# Patient Record
Sex: FEMALE | Race: WHITE | NOT HISPANIC OR LATINO | URBAN - METROPOLITAN AREA
[De-identification: names, ages, dates, MRNs, and addresses within clinical notes are randomized per-mention and may not be internally consistent; named-entity substitution may affect disease eponyms.]

---

## 2019-12-06 ENCOUNTER — OUTPATIENT (OUTPATIENT)
Dept: OUTPATIENT SERVICES | Facility: HOSPITAL | Age: 64
LOS: 1 days | Discharge: HOME | End: 2019-12-06

## 2019-12-06 DIAGNOSIS — I10 ESSENTIAL (PRIMARY) HYPERTENSION: ICD-10-CM

## 2020-04-13 ENCOUNTER — OUTPATIENT (OUTPATIENT)
Dept: OUTPATIENT SERVICES | Facility: HOSPITAL | Age: 65
LOS: 1 days | Discharge: HOME | End: 2020-04-13
Payer: COMMERCIAL

## 2020-04-13 DIAGNOSIS — U07.1 COVID-19: ICD-10-CM

## 2020-04-13 PROCEDURE — 71045 X-RAY EXAM CHEST 1 VIEW: CPT | Mod: 26

## 2020-04-13 PROCEDURE — 71046 X-RAY EXAM CHEST 2 VIEWS: CPT | Mod: 26

## 2022-10-23 ENCOUNTER — INPATIENT (INPATIENT)
Facility: HOSPITAL | Age: 67
LOS: 0 days | Discharge: HOME | End: 2022-10-24
Attending: SURGERY | Admitting: SURGERY

## 2022-10-23 VITALS
HEART RATE: 97 BPM | TEMPERATURE: 98 F | RESPIRATION RATE: 17 BRPM | SYSTOLIC BLOOD PRESSURE: 138 MMHG | OXYGEN SATURATION: 99 % | WEIGHT: 149.91 LBS | DIASTOLIC BLOOD PRESSURE: 86 MMHG

## 2022-10-23 LAB
ALBUMIN SERPL ELPH-MCNC: 4.5 G/DL — SIGNIFICANT CHANGE UP (ref 3.5–5.2)
ALP SERPL-CCNC: 73 U/L — SIGNIFICANT CHANGE UP (ref 30–115)
ALT FLD-CCNC: 37 U/L — SIGNIFICANT CHANGE UP (ref 0–41)
ANION GAP SERPL CALC-SCNC: 13 MMOL/L — SIGNIFICANT CHANGE UP (ref 7–14)
ANION GAP SERPL CALC-SCNC: 16 MMOL/L — HIGH (ref 7–14)
APTT BLD: 26.8 SEC — LOW (ref 27–39.2)
AST SERPL-CCNC: 47 U/L — HIGH (ref 0–41)
BASOPHILS # BLD AUTO: 0 K/UL — SIGNIFICANT CHANGE UP (ref 0–0.2)
BASOPHILS # BLD AUTO: 0.02 K/UL — SIGNIFICANT CHANGE UP (ref 0–0.2)
BASOPHILS NFR BLD AUTO: 0 % — SIGNIFICANT CHANGE UP (ref 0–1)
BASOPHILS NFR BLD AUTO: 0.4 % — SIGNIFICANT CHANGE UP (ref 0–1)
BILIRUB SERPL-MCNC: 0.4 MG/DL — SIGNIFICANT CHANGE UP (ref 0.2–1.2)
BLD GP AB SCN SERPL QL: SIGNIFICANT CHANGE UP
BUN SERPL-MCNC: 14 MG/DL — SIGNIFICANT CHANGE UP (ref 10–20)
BUN SERPL-MCNC: 16 MG/DL — SIGNIFICANT CHANGE UP (ref 10–20)
CALCIUM SERPL-MCNC: 8.9 MG/DL — SIGNIFICANT CHANGE UP (ref 8.4–10.5)
CALCIUM SERPL-MCNC: 9.4 MG/DL — SIGNIFICANT CHANGE UP (ref 8.4–10.5)
CHLORIDE SERPL-SCNC: 95 MMOL/L — LOW (ref 98–110)
CHLORIDE SERPL-SCNC: 96 MMOL/L — LOW (ref 98–110)
CO2 SERPL-SCNC: 24 MMOL/L — SIGNIFICANT CHANGE UP (ref 17–32)
CO2 SERPL-SCNC: 26 MMOL/L — SIGNIFICANT CHANGE UP (ref 17–32)
CREAT SERPL-MCNC: 0.7 MG/DL — SIGNIFICANT CHANGE UP (ref 0.7–1.5)
CREAT SERPL-MCNC: 0.8 MG/DL — SIGNIFICANT CHANGE UP (ref 0.7–1.5)
EGFR: 81 ML/MIN/1.73M2 — SIGNIFICANT CHANGE UP
EGFR: 95 ML/MIN/1.73M2 — SIGNIFICANT CHANGE UP
EOSINOPHIL # BLD AUTO: 0 K/UL — SIGNIFICANT CHANGE UP (ref 0–0.7)
EOSINOPHIL # BLD AUTO: 0.02 K/UL — SIGNIFICANT CHANGE UP (ref 0–0.7)
EOSINOPHIL NFR BLD AUTO: 0 % — SIGNIFICANT CHANGE UP (ref 0–8)
EOSINOPHIL NFR BLD AUTO: 0.4 % — SIGNIFICANT CHANGE UP (ref 0–8)
ETHANOL SERPL-MCNC: 271 MG/DL — HIGH
GIANT PLATELETS BLD QL SMEAR: PRESENT — SIGNIFICANT CHANGE UP
GLUCOSE SERPL-MCNC: 105 MG/DL — HIGH (ref 70–99)
GLUCOSE SERPL-MCNC: 115 MG/DL — HIGH (ref 70–99)
HCT VFR BLD CALC: 31 % — LOW (ref 37–47)
HCT VFR BLD CALC: 37.8 % — SIGNIFICANT CHANGE UP (ref 37–47)
HGB BLD-MCNC: 10.9 G/DL — LOW (ref 12–16)
HGB BLD-MCNC: 13.1 G/DL — SIGNIFICANT CHANGE UP (ref 12–16)
IMM GRANULOCYTES NFR BLD AUTO: 0.5 % — HIGH (ref 0.1–0.3)
INR BLD: 0.87 RATIO — SIGNIFICANT CHANGE UP (ref 0.65–1.3)
LACTATE SERPL-SCNC: 1.9 MMOL/L — SIGNIFICANT CHANGE UP (ref 0.7–2)
LIDOCAIN IGE QN: 35 U/L — SIGNIFICANT CHANGE UP (ref 7–60)
LYMPHOCYTES # BLD AUTO: 1.35 K/UL — SIGNIFICANT CHANGE UP (ref 1.2–3.4)
LYMPHOCYTES # BLD AUTO: 2.17 K/UL — SIGNIFICANT CHANGE UP (ref 1.2–3.4)
LYMPHOCYTES # BLD AUTO: 24.2 % — SIGNIFICANT CHANGE UP (ref 20.5–51.1)
LYMPHOCYTES # BLD AUTO: 28.7 % — SIGNIFICANT CHANGE UP (ref 20.5–51.1)
MAGNESIUM SERPL-MCNC: 1.9 MG/DL — SIGNIFICANT CHANGE UP (ref 1.8–2.4)
MANUAL SMEAR VERIFICATION: SIGNIFICANT CHANGE UP
MCHC RBC-ENTMCNC: 31.1 PG — HIGH (ref 27–31)
MCHC RBC-ENTMCNC: 31.5 PG — HIGH (ref 27–31)
MCHC RBC-ENTMCNC: 34.7 G/DL — SIGNIFICANT CHANGE UP (ref 32–37)
MCHC RBC-ENTMCNC: 35.2 G/DL — SIGNIFICANT CHANGE UP (ref 32–37)
MCV RBC AUTO: 88.6 FL — SIGNIFICANT CHANGE UP (ref 81–99)
MCV RBC AUTO: 90.9 FL — SIGNIFICANT CHANGE UP (ref 81–99)
MONOCYTES # BLD AUTO: 0.26 K/UL — SIGNIFICANT CHANGE UP (ref 0.1–0.6)
MONOCYTES # BLD AUTO: 0.47 K/UL — SIGNIFICANT CHANGE UP (ref 0.1–0.6)
MONOCYTES NFR BLD AUTO: 3.5 % — SIGNIFICANT CHANGE UP (ref 1.7–9.3)
MONOCYTES NFR BLD AUTO: 8.4 % — SIGNIFICANT CHANGE UP (ref 1.7–9.3)
NEUTROPHILS # BLD AUTO: 3.69 K/UL — SIGNIFICANT CHANGE UP (ref 1.4–6.5)
NEUTROPHILS # BLD AUTO: 5.12 K/UL — SIGNIFICANT CHANGE UP (ref 1.4–6.5)
NEUTROPHILS NFR BLD AUTO: 66.1 % — SIGNIFICANT CHANGE UP (ref 42.2–75.2)
NEUTROPHILS NFR BLD AUTO: 67.8 % — SIGNIFICANT CHANGE UP (ref 42.2–75.2)
NRBC # BLD: 0 /100 WBCS — SIGNIFICANT CHANGE UP (ref 0–0)
NRBC # BLD: 1 /100 — HIGH (ref 0–0)
NRBC # BLD: SIGNIFICANT CHANGE UP /100 WBCS (ref 0–0)
PHOSPHATE SERPL-MCNC: 4.3 MG/DL — SIGNIFICANT CHANGE UP (ref 2.1–4.9)
PLAT MORPH BLD: NORMAL — SIGNIFICANT CHANGE UP
PLATELET # BLD AUTO: 223 K/UL — SIGNIFICANT CHANGE UP (ref 130–400)
PLATELET # BLD AUTO: 276 K/UL — SIGNIFICANT CHANGE UP (ref 130–400)
POTASSIUM SERPL-MCNC: 4 MMOL/L — SIGNIFICANT CHANGE UP (ref 3.5–5)
POTASSIUM SERPL-MCNC: 4.4 MMOL/L — SIGNIFICANT CHANGE UP (ref 3.5–5)
POTASSIUM SERPL-SCNC: 4 MMOL/L — SIGNIFICANT CHANGE UP (ref 3.5–5)
POTASSIUM SERPL-SCNC: 4.4 MMOL/L — SIGNIFICANT CHANGE UP (ref 3.5–5)
PROT SERPL-MCNC: 7 G/DL — SIGNIFICANT CHANGE UP (ref 6–8)
PROTHROM AB SERPL-ACNC: 9.9 SEC — LOW (ref 9.95–12.87)
RBC # BLD: 3.5 M/UL — LOW (ref 4.2–5.4)
RBC # BLD: 4.16 M/UL — LOW (ref 4.2–5.4)
RBC # FLD: 13.3 % — SIGNIFICANT CHANGE UP (ref 11.5–14.5)
RBC # FLD: 13.6 % — SIGNIFICANT CHANGE UP (ref 11.5–14.5)
RBC BLD AUTO: NORMAL — SIGNIFICANT CHANGE UP
SARS-COV-2 RNA SPEC QL NAA+PROBE: SIGNIFICANT CHANGE UP
SODIUM SERPL-SCNC: 134 MMOL/L — LOW (ref 135–146)
SODIUM SERPL-SCNC: 136 MMOL/L — SIGNIFICANT CHANGE UP (ref 135–146)
WBC # BLD: 5.58 K/UL — SIGNIFICANT CHANGE UP (ref 4.8–10.8)
WBC # BLD: 7.55 K/UL — SIGNIFICANT CHANGE UP (ref 4.8–10.8)
WBC # FLD AUTO: 5.58 K/UL — SIGNIFICANT CHANGE UP (ref 4.8–10.8)
WBC # FLD AUTO: 7.55 K/UL — SIGNIFICANT CHANGE UP (ref 4.8–10.8)

## 2022-10-23 PROCEDURE — 70486 CT MAXILLOFACIAL W/O DYE: CPT | Mod: 26,MA

## 2022-10-23 PROCEDURE — 72170 X-RAY EXAM OF PELVIS: CPT | Mod: 26

## 2022-10-23 PROCEDURE — 93010 ELECTROCARDIOGRAM REPORT: CPT

## 2022-10-23 PROCEDURE — 71260 CT THORAX DX C+: CPT | Mod: 26,MA

## 2022-10-23 PROCEDURE — 71045 X-RAY EXAM CHEST 1 VIEW: CPT | Mod: 26

## 2022-10-23 PROCEDURE — 72125 CT NECK SPINE W/O DYE: CPT | Mod: 26,MA

## 2022-10-23 PROCEDURE — 70450 CT HEAD/BRAIN W/O DYE: CPT | Mod: 26,MA

## 2022-10-23 PROCEDURE — 99281 EMR DPT VST MAYX REQ PHY/QHP: CPT

## 2022-10-23 PROCEDURE — 74177 CT ABD & PELVIS W/CONTRAST: CPT | Mod: 26,MA

## 2022-10-23 PROCEDURE — 99283 EMERGENCY DEPT VISIT LOW MDM: CPT

## 2022-10-23 PROCEDURE — 99222 1ST HOSP IP/OBS MODERATE 55: CPT

## 2022-10-23 PROCEDURE — 99285 EMERGENCY DEPT VISIT HI MDM: CPT

## 2022-10-23 RX ORDER — BENAZEPRIL HYDROCHLORIDE AND HYDROCHLOROTHIAZIDE 10; 12.5 MG/1; MG/1
1 TABLET, FILM COATED ORAL
Qty: 0 | Refills: 0 | DISCHARGE

## 2022-10-23 RX ORDER — HYDROCHLOROTHIAZIDE 25 MG
25 TABLET ORAL DAILY
Refills: 0 | Status: DISCONTINUED | OUTPATIENT
Start: 2022-10-23 | End: 2022-10-24

## 2022-10-23 RX ORDER — SODIUM CHLORIDE 9 MG/ML
250 INJECTION INTRAMUSCULAR; INTRAVENOUS; SUBCUTANEOUS ONCE
Refills: 0 | Status: COMPLETED | OUTPATIENT
Start: 2022-10-23 | End: 2022-10-23

## 2022-10-23 RX ORDER — SODIUM CHLORIDE 9 MG/ML
1000 INJECTION, SOLUTION INTRAVENOUS
Refills: 0 | Status: DISCONTINUED | OUTPATIENT
Start: 2022-10-23 | End: 2022-10-23

## 2022-10-23 RX ORDER — IBUPROFEN 200 MG
600 TABLET ORAL EVERY 8 HOURS
Refills: 0 | Status: DISCONTINUED | OUTPATIENT
Start: 2022-10-23 | End: 2022-10-23

## 2022-10-23 RX ORDER — LISINOPRIL 2.5 MG/1
20 TABLET ORAL DAILY
Refills: 0 | Status: DISCONTINUED | OUTPATIENT
Start: 2022-10-23 | End: 2022-10-24

## 2022-10-23 RX ORDER — FOLIC ACID 0.8 MG
1 TABLET ORAL DAILY
Refills: 0 | Status: DISCONTINUED | OUTPATIENT
Start: 2022-10-23 | End: 2022-10-24

## 2022-10-23 RX ORDER — TETANUS TOXOID, REDUCED DIPHTHERIA TOXOID AND ACELLULAR PERTUSSIS VACCINE, ADSORBED 5; 2.5; 8; 8; 2.5 [IU]/.5ML; [IU]/.5ML; UG/.5ML; UG/.5ML; UG/.5ML
0.5 SUSPENSION INTRAMUSCULAR ONCE
Refills: 0 | Status: COMPLETED | OUTPATIENT
Start: 2022-10-23 | End: 2022-10-23

## 2022-10-23 RX ORDER — GABAPENTIN 400 MG/1
300 CAPSULE ORAL THREE TIMES A DAY
Refills: 0 | Status: DISCONTINUED | OUTPATIENT
Start: 2022-10-23 | End: 2022-10-24

## 2022-10-23 RX ORDER — ACETAMINOPHEN 500 MG
650 TABLET ORAL EVERY 6 HOURS
Refills: 0 | Status: DISCONTINUED | OUTPATIENT
Start: 2022-10-23 | End: 2022-10-24

## 2022-10-23 RX ORDER — THIAMINE MONONITRATE (VIT B1) 100 MG
100 TABLET ORAL DAILY
Refills: 0 | Status: DISCONTINUED | OUTPATIENT
Start: 2022-10-23 | End: 2022-10-24

## 2022-10-23 RX ADMIN — GABAPENTIN 300 MILLIGRAM(S): 400 CAPSULE ORAL at 21:38

## 2022-10-23 RX ADMIN — LISINOPRIL 20 MILLIGRAM(S): 2.5 TABLET ORAL at 06:57

## 2022-10-23 RX ADMIN — Medication 650 MILLIGRAM(S): at 19:40

## 2022-10-23 RX ADMIN — Medication 25 MILLIGRAM(S): at 06:58

## 2022-10-23 RX ADMIN — Medication 650 MILLIGRAM(S): at 06:58

## 2022-10-23 RX ADMIN — TETANUS TOXOID, REDUCED DIPHTHERIA TOXOID AND ACELLULAR PERTUSSIS VACCINE, ADSORBED 0.5 MILLILITER(S): 5; 2.5; 8; 8; 2.5 SUSPENSION INTRAMUSCULAR at 02:53

## 2022-10-23 RX ADMIN — Medication 600 MILLIGRAM(S): at 06:57

## 2022-10-23 RX ADMIN — SODIUM CHLORIDE 125 MILLILITER(S): 9 INJECTION, SOLUTION INTRAVENOUS at 06:34

## 2022-10-23 RX ADMIN — SODIUM CHLORIDE 250 MILLILITER(S): 9 INJECTION INTRAMUSCULAR; INTRAVENOUS; SUBCUTANEOUS at 02:55

## 2022-10-23 RX ADMIN — GABAPENTIN 300 MILLIGRAM(S): 400 CAPSULE ORAL at 06:57

## 2022-10-23 NOTE — H&P ADULT - HISTORY OF PRESENT ILLNESS
TRAUMA ACTIVATION LEVEL:  ALERT   ACTIVATED BY:  ED**  INTUBATED: NO**      MECHANISM OF INJURY:   [x] Fall	      GCS: 15 	E: 4	V: 5	M: 6    HPI:    66yF w/ PMHx of HTN seen as Trauma Alert  s/p unwitnessed fall, +ht, ?loc, -ac, +etoh.  The patient was BIBA after her tenant found her on the floor of her living room at the base of the stairs. The patient is accompanied by two people who stated they were at a dinner party with her, saw her about 30 minutes before the fall. The patient admits to drinking some wine, does not remember the fall, denies any pain. The patient has a laceration to the back of her head, not actively bleeding. During examination of her head and neck, several pieces of glass were pulled from her matted hair.

## 2022-10-23 NOTE — CONSULT NOTE ADULT - ASSESSMENT
66yF w/ PMHx of HTN seen as Trauma Alert  s/p unwitnessed fall, +ht, ?loc, -ac, +etoh.  The patient was BIBA after her tenant found her on the floor of her living room at the base of the stairs    # Fall associated with trauma  # Left posterior scalp laceration  # Left rib fractures non-displaced 3-8th, 7th displaced  # Left gluteal hematoma  - pain control  - trauma team management    # ETOH intoxication  -     # HTN    # DVT prophylaxis - on lovenox subcut  # Code status - Full Code 66yF w/ PMHx of HTN seen as Trauma Alert  s/p unwitnessed fall, +ht, ?loc, -ac, +etoh.  The patient was BIBA after her tenant found her on the floor of her living room at the base of the stairs    # Syncope 2/2 ETOH intoxication vs. arrhythmogenic/seziure etiology  # Fall associated with trauma  # Left posterior scalp laceration  # Left rib fractures non-displaced 3-8th, 7th displaced  # Left gluteal hematoma  - pain control  - trauma team management  - no prior cardiac history or seizure reported, EKG reviewed no ischemic changes  - given significant murmur on exam and left carotid bruit, may obtain vascular duplex of bilateral carotid and 2D echo to evaluate for carotid stenosis or significant valvular disease   - obtain TSH and vitamin B12 level  - may consider routine EEG if there is further consider for seizure etiology    # ETOH intoxication  - consider CIWA protocol  - consider adding thiamine and folate given ETOH use    # HTN, stable  - c/w home BP meds    # DVT prophylaxis - on lovenox subcut  # Code status - Full code 66yF w/ PMHx of HTN seen as Trauma Alert  s/p unwitnessed fall, +ht, ?loc, -ac, +etoh.  The patient was BIBA after her tenant found her on the floor of her living room at the base of the stairs    # Syncope 2/2 ETOH intoxication rule out arrhythmogenic vs. seizure etiology  # Fall associated with trauma  # Left posterior scalp laceration  # Left rib fractures non-displaced 3-8th, 7th displaced  # Compression fracture and transverse process fracture of lumbar vertebrae  # Left gluteal hematoma  - pain control  - trauma team management  - no prior cardiac history or seizure reported, EKG reviewed no ischemic changes  - given significant murmur on exam and left carotid bruit, may obtain vascular duplex of bilateral carotid and 2D echo to evaluate for carotid stenosis or significant valvular disease   - obtain TSH and vitamin B12 level  - may consider routine EEG if there is further consider for seizure etiology    # ETOH intoxication  - consider CIWA protocol  - consider adding thiamine and folate given ETOH use    # HTN, stable  - c/w home BP meds    # DVT prophylaxis - SCD, chemical prophylaxis if there is no contraindication  # Code status - Full code

## 2022-10-23 NOTE — ED PROVIDER NOTE - PATIENT PORTAL LINK FT
You can access the FollowMyHealth Patient Portal offered by [facility] by registering at the following website: [email or web address]. By joining [facility]’s FollowMyHealth portal, you will also be able to view your health information using other applications (apps) compatible with our system.

## 2022-10-23 NOTE — H&P ADULT - ASSESSMENT
ASSESSMENT:  66y Female  w/ PMHx of HTN seen as  Trauma Alert  s/p unwitnessed fall, +ht,?loc, -ac, +etoh with no complaints of pain , external signs of trauma include ?posterior scalp laceration- dried blood on face and matted hair d/t dried blood- no laceration found. Trauma assessment in ED: ABCs intact , GCS 15 , AAOx3,  PAUL.     Injuries identified:   -Left gluteal subcutaneous hematoma measuring up to 7.1 x 1.8 cm with   focus of contrast extravasation, suggesting active arterial bleed.  - Left lateral 3-8th rib cortical irregularity, suggesting acute   nondisplaced fractures; no pneumothorax. Minimally displaced left   posterior 7th rib fracture.  - Acute, minimally displaced lumbar L4 left transverse process fracture.  -Age-indeterminate L1 vertebral body mild compression fracture   deformity; possibly chronic.    PLAN:   - Trauma Labs: (CBC, BMP, Coags, T&S, UA, EtOH level)  -Pan scan, CT max/face  -CXR, Pelvic xray  - Incentive spirometer (1500)  - PT consult  - Pain control  - Hold DVT prophylaxis until repeat Hb  - Trend Hb  - No acute intervention from neurosurgery    Of note, patient wanted to leave AMA and was adamantly refusing to stay, but she is intoxicated and had to be admitted. Admitted to step down unit.

## 2022-10-23 NOTE — CONSULT NOTE ADULT - ASSESSMENT
IMPRESSION: Rehab of multiple trauma as above. Patient refused PT today and states that she was able to walk today w/out assistance. Plans to go home as soon as cleared.    PRECAUTIONS: [  ] Cardiac  [  ] Respiratory  [  ] Seizures [  ] Contact Isolation  [  ] Droplet Isolation  [  ] Other    Weight Bearing Status: wbat    RECOMMENDATION:    Out of Bed to Chair     DVT/Decubiti Prophylaxis    REHAB PLAN:     [ x  ] Bedside P/T 3-5 times a week   [ x  ]   Bedside O/T  2-3 times a week             [   ] No Rehab Therapy Indicated                   [   ]  Speech Therapy   Conditioning/ROM                                    ADL  Bed Mobility                                               Conditioning/ROM  Transfers                                                     Bed Mobility  Sitting /Standing Balance                         Transfers                                        Gait Training                                               Sitting/Standing Balance  Stair Training [   ]Applicable                    Home equipment Eval                                                                        Splinting  [   ] Only      GOALS:   ADL   [ x  ]   Independent                    Transfers  [ x  ] Independent                          Ambulation  [ x  ] Independent     [    ] With device                            [   ]  CG                                                         [   ]  CG                                                                  [   ] CG                            [    ] Min A                                                   [   ] Min A                                                              [   ] Min  A          DISCHARGE PLAN:   [   ]  Good candidate for Intensive Rehabilitation/Hospital based-4A [facility]                                             Will tolerate 3hrs Intensive Rehab Daily                                       [    ]  Short Term Rehab in Skilled Nursing Facility                                       [ x   ]  Home with Outpatient or VN services if needed                                         [    ]  Possible Candidate for Intensive Hospital based Rehab                                        IMPRESSION: Rehab of multiple trauma as above. Patient refused PT today and states that she was able to walk today w/out assistance. Plans to go home as soon as cleared.    PRECAUTIONS: [  ] Cardiac  [  ] Respiratory  [  ] Seizures [  ] Contact Isolation  [  ] Droplet Isolation  [  ] Other    Weight Bearing Status: wbat    RECOMMENDATION:    Incentive Spirometer q 1 hrs while awake    Out of Bed to Chair     DVT/Decubiti Prophylaxis    REHAB PLAN:     [ x  ] Bedside P/T 3-5 times a week   [   ]   Bedside O/T  2-3 times a week             [   ] No Rehab Therapy Indicated                   [   ]  Speech Therapy   Conditioning/ROM                                    ADL  Bed Mobility                                               Conditioning/ROM  Transfers                                                     Bed Mobility  Sitting /Standing Balance                         Transfers                                        Gait Training                                               Sitting/Standing Balance  Stair Training [   ]Applicable                    Home equipment Eval                                                                        Splinting  [   ] Only      GOALS:   ADL   [ x  ]   Independent                    Transfers  [ x  ] Independent                          Ambulation  [ x  ] Independent     [    ] With device                            [   ]  CG                                                         [   ]  CG                                                                  [   ] CG                            [    ] Min A                                                   [   ] Min A                                                              [   ] Min  A          DISCHARGE PLAN:   [   ]  Good candidate for Intensive Rehabilitation/Hospital based-[facility]A [facility]                                             Will tolerate 3hrs Intensive Rehab Daily                                       [    ]  Short Term Rehab in Skilled Nursing Facility                                       [ x   ]  Home with Outpatient or VN services if needed                                         [    ]  Possible Candidate for Intensive Hospital based Rehab

## 2022-10-23 NOTE — CONSULT NOTE ADULT - SUBJECTIVE AND OBJECTIVE BOX
Date of Admission: 10-23-22    CHIEF COMPLAINT:     HPI:  TRAUMA ACTIVATION LEVEL:  ALERT   ACTIVATED BY:  ED**  INTUBATED: NO**      MECHANISM OF INJURY:   [x] Fall	      GCS: 15 	E: 4	V: 5	M: 6    HPI:    66yF w/ PMHx of HTN seen as Trauma Alert  s/p unwitnessed fall, +ht, ?loc, -ac, +etoh.  The patient was BIBA after her tenant found her on the floor of her living room at the base of the stairs. The patient is accompanied by two people who stated they were at a dinner party with her, saw her about 30 minutes before the fall. The patient admits to drinking some wine, does not remember the fall, denies any pain. The patient has a laceration to the back of her head, not actively bleeding. During examination of her head and neck, several pieces of glass were pulled from her matted hair.   (23 Oct 2022 05:20)      PAST MEDICAL & SURGICAL HISTORY:      FAMILY HISTORY:  [x] no pertinent family history of premature cardiovascular disease in first degree relatives.    SOCIAL HISTORY:    [  ] Non-smoker  [  ] Ex-Smoker  [  ] No alcohol use  [x] No illicit drug use    Allergies    No Known Allergies    Intolerances      REVIEW OF SYSTEMS:  CONSTITUTIONAL: No fever, weight loss, or fatigue  CARDIOLOGY: No chest pain, dyspnea of exertion, or syncopal episodes.   RESPIRATORY: No shortness of breath, cough, wheezing.   NEUROLOGICAL: No weakness, no focal deficits to report.  GI: No BRBPR, no N,V,diarrhea.    PSYCHIATRY: Normal mood and affect.  HEENT: No nasal discharge, no ecchymosis  SKIN: No ecchymosis, no breakdown  MUSCULOSKELETAL: Full range of motion x4.   EXTREM: No leg swelling or erythema.    PHYSICAL EXAM:  T(C): 36.5 (10-23-22 @ 00:32), Max: 36.5 (10-23-22 @ 00:32)  HR: 90 (10-23-22 @ 09:00) (90 - 100)  BP: 110/68 (10-23-22 @ 09:00) (105/63 - 138/86)  RR: 18 (10-23-22 @ 09:00) (16 - 18)  SpO2: 97% (10-23-22 @ 09:00) (97% - 100%)  Wt(kg): --  I&O's Summary        LABS:	 	                        13.1   7.55  )-----------( 276      ( 23 Oct 2022 00:53 )             37.8     10-23    136  |  96<L>  |  14  ----------------------------<  105<H>  4.4   |  24  |  0.7    Ca    9.4      23 Oct 2022 00:53    TPro  7.0  /  Alb  4.5  /  TBili  0.4  /  DBili  x   /  AST  47<H>  /  ALT  37  /  AlkPhos  73  10-23        PT/INR - ( 23 Oct 2022 00:55 )   PT: 9.90 sec;   INR: 0.87 ratio         PTT - ( 23 Oct 2022 00:55 )  PTT:26.8 sec    TELEMETRY EVENTS: 	    ECG:  	  RADIOLOGY:    < from: CT Abdomen and Pelvis w/ IV Cont (10.23.22 @ 02:11) >  IMPRESSION:    1. Left gluteal subcutaneous hematoma measuring up to 7.1 x 1.8 cm with   focus of contrast extravasation, suggesting active arterial bleed.    2. Left lateral 3-8th rib cortical irregularity, suggesting acute   nondisplaced fractures; no pneumothorax. Minimally displaced left   posterior 7th rib fracture.    3. Acute, minimally displaced lumbar L4 left transverse process fracture.    4. Age-indeterminate L1 vertebral body mild compression fracture   deformity; possibly chronic.    --- End of Report ---    < from: CT Cervical Spine No Cont (10.23.22 @ 01:43) >  No evidence of acute fracture of the cervical spine.    < from: CT Maxillofacial No Cont (10.23.22 @ 01:37) >  IMPRESSION:  IMPRESSION CT HEAD:  No evidence of acute intracranial abnormality.  IMPRESSION CT MAXILLOFACIAL:  No evidence of acute fracture or dislocation.      OTHER: 	    	  Home Medications:  benazepril-hydrochlorothiazide 20 mg-25 mg oral tablet: 1 tab(s) orally once a day (23 Oct 2022 05:22)    MEDICATIONS  (STANDING):  acetaminophen     Tablet .. 650 milliGRAM(s) Oral every 6 hours  gabapentin 300 milliGRAM(s) Oral three times a day  hydrochlorothiazide 25 milliGRAM(s) Oral daily  lisinopril 20 milliGRAM(s) Oral daily    MEDICATIONS  (PRN):         Date of Admission: 10-23-22    CHIEF COMPLAINT:     HPI:  TRAUMA ACTIVATION LEVEL:  ALERT   ACTIVATED BY:  ED**  INTUBATED: NO**      MECHANISM OF INJURY:   [x] Fall	      GCS: 15 	E: 4	V: 5	M: 6    HPI:    66yF w/ PMHx of HTN seen as Trauma Alert  s/p unwitnessed fall, +ht, ?loc, -ac, +etoh.  The patient was BIBA after her tenant found her on the floor of her living room at the base of the stairs. The patient is accompanied by two people who stated they were at a dinner party with her, saw her about 30 minutes before the fall. The patient admits to drinking some wine, does not remember the fall, denies any pain. The patient has a laceration to the back of her head, not actively bleeding. During examination of her head and neck, several pieces of glass were pulled from her matted hair.   (23 Oct 2022 05:20)      PAST MEDICAL & SURGICAL HISTORY:  HTN  Trochanteric fracture s/p pinning    FAMILY HISTORY:  [x] FH of heart disease in mother    SOCIAL HISTORY:    [x] Non-smoker  [x] Social alcohol use  [x] No illicit drug use    Allergies    No Known Allergies    Intolerances      REVIEW OF SYSTEMS:  CONSTITUTIONAL: No fever, weight loss, or fatigue  CARDIOLOGY: No chest pain, dyspnea of exertion, or syncopal episodes.   RESPIRATORY: No shortness of breath, cough, wheezing.   NEUROLOGICAL: No weakness, no focal deficits to report.  GI: No BRBPR, no N,V,diarrhea.    PSYCHIATRY: Normal mood and affect.  HEENT: No nasal discharge, no ecchymosis  SKIN: No ecchymosis, no breakdown  MUSCULOSKELETAL: Full range of motion x4.   EXTREM: No leg swelling or erythema.    Vital Signs Last 24 Hrs  T(C): 36.5 (23 Oct 2022 00:32), Max: 36.5 (23 Oct 2022 00:32)  T(F): 97.7 (23 Oct 2022 00:32), Max: 97.7 (23 Oct 2022 00:32)  HR: 90 (23 Oct 2022 09:00) (90 - 100)  BP: 110/68 (23 Oct 2022 09:00) (105/63 - 138/86)  BP(mean): 82 (23 Oct 2022 09:00) (82 - 88)  ABP: --  ABP(mean): --  RR: 18 (23 Oct 2022 09:00) (16 - 18)  SpO2: 97% (23 Oct 2022 09:00) (97% - 100%)    O2 Parameters below as of 23 Oct 2022 09:00  Patient On (Oxygen Delivery Method): room air    MEDICATIONS  (STANDING):  acetaminophen     Tablet .. 650 milliGRAM(s) Oral every 6 hours  gabapentin 300 milliGRAM(s) Oral three times a day  hydrochlorothiazide 25 milliGRAM(s) Oral daily  lisinopril 20 milliGRAM(s) Oral daily    MEDICATIONS  (PRN):    PHYSICAL EXAM:  General Appearance: NAD, normal for age and gender. 	  Neck: Normal JVP, noted left sided bruit.  Eyes: Conjunctiva clear, Extra Ocular muscles intact. No scleral icterus. Periorbital ecchymosis left greater than right.  ENMT: Moist oral mucosa. No oral lesion.  Cardiovascular: Regular rate and rhythm S1 S2, No JVD, systolic murmur.  Respiratory: Lungs clear to auscultation. No wheezes, rales or rhonchi.  Psychiatry: Alert and oriented x 3, Mood & affect appropriate.  Gastrointestinal:  Soft, Non-tender, Non-distended.  Neurologic: Non-focal deficits.  Musculoskeletal/ extremities: Normal range of motion, No clubbing, cyanosis or edema.  Vascular: Peripheral pulses palpable bilaterally.  Skin/Integumen: No rashes, No ecchymoses, No cyanosis. Left gluteal hematoma not examined. Left posterior scalp laceration noted with staples.        LABS:	 	                        13.1   7.55  )-----------( 276      ( 23 Oct 2022 00:53 )             37.8     10-23    136  |  96<L>  |  14  ----------------------------<  105<H>  4.4   |  24  |  0.7    Ca    9.4      23 Oct 2022 00:53    TPro  7.0  /  Alb  4.5  /  TBili  0.4  /  DBili  x   /  AST  47<H>  /  ALT  37  /  AlkPhos  73  10-23        PT/INR - ( 23 Oct 2022 00:55 )   PT: 9.90 sec;   INR: 0.87 ratio         PTT - ( 23 Oct 2022 00:55 )  PTT:26.8 sec    TELEMETRY EVENTS: 	    ECG: < from: 12 Lead ECG (10.23.22 @ 03:50) >    Diagnosis Line Sinus tachycardia  Otherwise normal ECG     	  RADIOLOGY:    < from: CT Abdomen and Pelvis w/ IV Cont (10.23.22 @ 02:11) >  IMPRESSION:    1. Left gluteal subcutaneous hematoma measuring up to 7.1 x 1.8 cm with   focus of contrast extravasation, suggesting active arterial bleed.    2. Left lateral 3-8th rib cortical irregularity, suggesting acute   nondisplaced fractures; no pneumothorax. Minimally displaced left   posterior 7th rib fracture.    3. Acute, minimally displaced lumbar L4 left transverse process fracture.    4. Age-indeterminate L1 vertebral body mild compression fracture   deformity; possibly chronic.    --- End of Report ---    < from: CT Cervical Spine No Cont (10.23.22 @ 01:43) >  No evidence of acute fracture of the cervical spine.    < from: CT Maxillofacial No Cont (10.23.22 @ 01:37) >  IMPRESSION:  IMPRESSION CT HEAD:  No evidence of acute intracranial abnormality.  IMPRESSION CT MAXILLOFACIAL:  No evidence of acute fracture or dislocation.      OTHER: 	    	  Home Medications:  benazepril-hydrochlorothiazide 20 mg-25 mg oral tablet: 1 tab(s) orally once a day (23 Oct 2022 05:22)    MEDICATIONS  (STANDING):  acetaminophen     Tablet .. 650 milliGRAM(s) Oral every 6 hours  gabapentin 300 milliGRAM(s) Oral three times a day  hydrochlorothiazide 25 milliGRAM(s) Oral daily  lisinopril 20 milliGRAM(s) Oral daily    MEDICATIONS  (PRN):         Date of Admission: 10-23-22    CHIEF COMPLAINT:     HPI:  TRAUMA ACTIVATION LEVEL:  ALERT   ACTIVATED BY:  ED**  INTUBATED: NO**      MECHANISM OF INJURY:   [x] Fall	      GCS: 15 	E: 4	V: 5	M: 6    HPI:    66yF w/ PMHx of HTN seen as Trauma Alert  s/p unwitnessed fall, +ht, ?loc, -ac, +etoh.  The patient was BIBA after her tenant found her on the floor of her living room at the base of the stairs. The patient is accompanied by two people who stated they were at a dinner party with her, saw her about 30 minutes before the fall. The patient admits to drinking some wine, does not remember the fall, denies any pain. The patient has a laceration to the back of her head, not actively bleeding. During examination of her head and neck, several pieces of glass were pulled from her matted hair.   (23 Oct 2022 05:20)      PAST MEDICAL & SURGICAL HISTORY:  HTN  Trochanteric fracture s/p pinning    FAMILY HISTORY:  [x] FH of heart disease in mother    SOCIAL HISTORY:    [x] Non-smoker  [x] Social alcohol use  [x] No illicit drug use    Allergies    No Known Allergies    Intolerances      REVIEW OF SYSTEMS:  CONSTITUTIONAL: No fever, weight loss, or fatigue  CARDIOLOGY: No chest pain, dyspnea of exertion, or syncopal episodes.   RESPIRATORY: No shortness of breath, cough, wheezing.   NEUROLOGICAL: No weakness, no focal deficits to report.  GI: No BRBPR, no N,V,diarrhea.    PSYCHIATRY: Normal mood and affect.  HEENT: No nasal discharge, no ecchymosis  SKIN: No ecchymosis, no breakdown. Left buttock pain. Back of scalp laceration.  MUSCULOSKELETAL: Full range of motion x4. Fall.   EXTREM: No leg swelling or erythema.    Vital Signs Last 24 Hrs  T(C): 36.5 (23 Oct 2022 00:32), Max: 36.5 (23 Oct 2022 00:32)  T(F): 97.7 (23 Oct 2022 00:32), Max: 97.7 (23 Oct 2022 00:32)  HR: 90 (23 Oct 2022 09:00) (90 - 100)  BP: 110/68 (23 Oct 2022 09:00) (105/63 - 138/86)  BP(mean): 82 (23 Oct 2022 09:00) (82 - 88)  ABP: --  ABP(mean): --  RR: 18 (23 Oct 2022 09:00) (16 - 18)  SpO2: 97% (23 Oct 2022 09:00) (97% - 100%)    O2 Parameters below as of 23 Oct 2022 09:00  Patient On (Oxygen Delivery Method): room air    MEDICATIONS  (STANDING):  acetaminophen     Tablet .. 650 milliGRAM(s) Oral every 6 hours  gabapentin 300 milliGRAM(s) Oral three times a day  hydrochlorothiazide 25 milliGRAM(s) Oral daily  lisinopril 20 milliGRAM(s) Oral daily    MEDICATIONS  (PRN):    PHYSICAL EXAM:  General Appearance: NAD, normal for age and gender. 	  Neck: Normal JVP, noted left sided bruit.  Eyes: Conjunctiva clear, Extra Ocular muscles intact. No scleral icterus. Periorbital ecchymosis left greater than right.  ENMT: Moist oral mucosa. No oral lesion.  Cardiovascular: Regular rate and rhythm S1 S2, No JVD, systolic murmur.  Respiratory: Lungs clear to auscultation. No wheezes, rales or rhonchi.  Psychiatry: Alert and oriented x 3, Mood & affect appropriate.  Gastrointestinal:  Soft, Non-tender, Non-distended.  Neurologic: Non-focal deficits.  Musculoskeletal/ extremities: Normal range of motion, No clubbing, cyanosis or edema.  Vascular: Peripheral pulses palpable bilaterally.  Skin/Integumen: No rashes, No ecchymoses, No cyanosis. Left gluteal hematoma not examined. Left posterior scalp laceration noted with staples.        LABS:	 	                        13.1   7.55  )-----------( 276      ( 23 Oct 2022 00:53 )             37.8     10-23    136  |  96<L>  |  14  ----------------------------<  105<H>  4.4   |  24  |  0.7    Ca    9.4      23 Oct 2022 00:53    TPro  7.0  /  Alb  4.5  /  TBili  0.4  /  DBili  x   /  AST  47<H>  /  ALT  37  /  AlkPhos  73  10-23        PT/INR - ( 23 Oct 2022 00:55 )   PT: 9.90 sec;   INR: 0.87 ratio    PTT - ( 23 Oct 2022 00:55 )  PTT:26.8 sec    TELEMETRY EVENTS: 	    ECG: < from: 12 Lead ECG (10.23.22 @ 03:50) >    Diagnosis Line Sinus tachycardia  Otherwise normal ECG     	  RADIOLOGY:    < from: CT Abdomen and Pelvis w/ IV Cont (10.23.22 @ 02:11) >  IMPRESSION:    1. Left gluteal subcutaneous hematoma measuring up to 7.1 x 1.8 cm with   focus of contrast extravasation, suggesting active arterial bleed.    2. Left lateral 3-8th rib cortical irregularity, suggesting acute   nondisplaced fractures; no pneumothorax. Minimally displaced left   posterior 7th rib fracture.    3. Acute, minimally displaced lumbar L4 left transverse process fracture.    4. Age-indeterminate L1 vertebral body mild compression fracture   deformity; possibly chronic.    --- End of Report ---    < from: CT Cervical Spine No Cont (10.23.22 @ 01:43) >  No evidence of acute fracture of the cervical spine.    < from: CT Maxillofacial No Cont (10.23.22 @ 01:37) >  IMPRESSION:  IMPRESSION CT HEAD:  No evidence of acute intracranial abnormality.  IMPRESSION CT MAXILLOFACIAL:  No evidence of acute fracture or dislocation.      OTHER: 	    	  Home Medications:  benazepril-hydrochlorothiazide 20 mg-25 mg oral tablet: 1 tab(s) orally once a day (23 Oct 2022 05:22)    MEDICATIONS  (STANDING):  acetaminophen     Tablet .. 650 milliGRAM(s) Oral every 6 hours  gabapentin 300 milliGRAM(s) Oral three times a day  hydrochlorothiazide 25 milliGRAM(s) Oral daily  lisinopril 20 milliGRAM(s) Oral daily    MEDICATIONS  (PRN):

## 2022-10-23 NOTE — CONSULT NOTE ADULT - SUBJECTIVE AND OBJECTIVE BOX
HPI: Patient is a 66 year-old female presenting to the hospital for evaluation after being found at the bottom of her steps by her tenant. Patient claims she does not remember falling down the stairs but that she presumes she did. Cannot recall whether she hit her head or her back. Neurosurgery was consulted after imaging demonstrated age indeterminate L1 compression fracture and acute L4 TP fracture. Patient was seen and examined at bedside in ED. She is lying in bed A&Ox3 and following all commands. She denies back pain, pain radiating down lower extremities, numbness, tingling, urinary incontinence/retention, saddle anesthesia, headaches at this time.        PAST MEDICAL & SURGICAL HISTORY:      Home Medications:      Allergies    No Known Allergies    Intolerances        ROS:  [X] A ten-point review of systems is negative except as noted   [  ] Due to altered mental status/intubation, subjective information were not able to be obtained from the patient. History was obtained, to the extent possible, from review of the chart and collateral sources of information    MEDICATIONS  (STANDING):    MEDICATIONS  (PRN):      ICU Vital Signs Last 24 Hrs  T(C): 36.5 (23 Oct 2022 00:32), Max: 36.5 (23 Oct 2022 00:32)  T(F): 97.7 (23 Oct 2022 00:32), Max: 97.7 (23 Oct 2022 00:32)  HR: 97 (23 Oct 2022 00:32) (97 - 97)  BP: 138/86 (23 Oct 2022 00:32) (138/86 - 138/86)  BP(mean): --  ABP: --  ABP(mean): --  RR: 17 (23 Oct 2022 00:32) (17 - 17)  SpO2: 99% (23 Oct 2022 00:32) (99% - 99%)    O2 Parameters below as of 23 Oct 2022 00:32  Patient On (Oxygen Delivery Method): room air            I&O's Detail      CBC Full  -  ( 23 Oct 2022 00:53 )  WBC Count : 7.55 K/uL  RBC Count : 4.16 M/uL  Hemoglobin : 13.1 g/dL  Hematocrit : 37.8 %  Platelet Count - Automated : 276 K/uL  Mean Cell Volume : 90.9 fL  Mean Cell Hemoglobin : 31.5 pg  Mean Cell Hemoglobin Concentration : 34.7 g/dL  Auto Neutrophil # : 5.12 K/uL  Auto Lymphocyte # : 2.17 K/uL  Auto Monocyte # : 0.26 K/uL  Auto Eosinophil # : 0.00 K/uL  Auto Basophil # : 0.00 K/uL  Auto Neutrophil % : 67.8 %  Auto Lymphocyte % : 28.7 %  Auto Monocyte % : 3.5 %  Auto Eosinophil % : 0.0 %  Auto Basophil % : 0.0 %    10-23    136  |  96<L>  |  14  ----------------------------<  105<H>  4.4   |  24  |  0.7    Ca    9.4      23 Oct 2022 00:53    TPro  7.0  /  Alb  4.5  /  TBili  0.4  /  DBili  x   /  AST  47<H>  /  ALT  37  /  AlkPhos  73  10-23            Physical Exam:  General: Lying in bed, following all commands  AAOX3. Verbal function intact  Facial motions symmetric  EOMI  Motor: MAEx4, good bulk and tone  5/5 strength in b/l UE's and LE's  Reflexes: Patellar reflexes 2+ b/l and symmetric  Sensation: intact to touch in all extremities  Back non tender to palpation of midline/paraspinal muscles      Imaging:  < from: CT Chest w/ IV Cont (10.23.22 @ 02:11) >  BONES/SOFT TISSUES: Right femoral gamma nail fixation. Degenerative   change of spine. Left lateral 3-8th rib cortical irregularity, suggesting   acute nondisplaced fractures; no pneumothorax. Minimally displaced left   posterior 7th rib fracture. Chronic bilateral rib fractures. Acute,   minimally displaced lumbar L4 left transverse process fracture.   Age-indeterminate L1 vertebral body mild compression fracture deformity;   possibly chronic.      Assessment/Plan:  66 year-old female s/p fall down stairs. CT demonstrating age indeterminate L1 compression fracture, acute left L4 TP fx.  -PT/OT/Rehab  -Pain management if develops pain  -No brace/abdominal binder in setting of rib fracutres  -No neurosurgical intervention  -Patient may follow up outpatient with Dr. Johnson

## 2022-10-23 NOTE — ED PROVIDER NOTE - CARE PLAN
1 Principal Discharge DX:	Syncope  Secondary Diagnosis:	Laceration of head  Secondary Diagnosis:	Head injury   Principal Discharge DX:	Syncope  Secondary Diagnosis:	Laceration of head  Secondary Diagnosis:	Head injury  Secondary Diagnosis:	Rib fracture  Secondary Diagnosis:	Lumbar compression fracture

## 2022-10-23 NOTE — ED ADULT NURSE NOTE - OBJECTIVE STATEMENT
Pt. BIBA s/p fall in the house, +LOC, intoxicated, was unresponsive when EMS arrived, fire fighters had to brake the door to get in, has laceration on the back of the head, unable to recall events

## 2022-10-23 NOTE — CONSULT NOTE ADULT - NSCONSULTADDITIONALINFOA_GEN_ALL_CORE
Laceration Scalp and left gluteal hematoma post fall Multiple rib fractures admitted for step down/SICU for trauma evaluation.

## 2022-10-23 NOTE — ED PROVIDER NOTE - PHYSICAL EXAMINATION
CONSTITUTIONAL: Well-developed; well-nourished; in no acute distress. GCS 15, speaking in full sentences, moving all extremities  SKIN: warm, dry  HEAD: Normocephalic; Right posterior parietal scalp 6cm lac, actively bleeding, nonpulsatile.  EYES: PERRL, EOMI, no conjunctival erythema  ENT: No nasal discharge; airway clear, mucous membranes moist  NECK: C collar in place   CARD: +S1, S2 no murmurs, gallops, or rubs. Regular rate and rhythm. radial 2+ b/l, no chest wall tenderness or crepitus  RESP: No wheezes, rales or rhonchi. CTABL  ABD: soft ntnd, no rebound, no guarding, no rigidity  EXT: moves all extremities,  No clubbing, cyanosis or edema.   NEURO: Alert, oriented, grossly unremarkable, cn ii-xii grossly intact, follows commands  PSYCH: Cooperative, appropriate.

## 2022-10-23 NOTE — PATIENT PROFILE ADULT - FALL HARM RISK - HARM RISK INTERVENTIONS
Assistance with ambulation/Assistance OOB with selected safe patient handling equipment/Communicate Risk of Fall with Harm to all staff/Monitor for mental status changes/Monitor gait and stability/Orthostatic vital signs/Reinforce activity limits and safety measures with patient and family/Tailored Fall Risk Interventions/Toileting schedule using arm’s reach rule for commode and bathroom/Use of alarms - bed, chair and/or voice tab/Visual Cue: Yellow wristband and red socks/Bed in lowest position, wheels locked, appropriate side rails in place/Call bell, personal items and telephone in reach/Instruct patient to call for assistance before getting out of bed or chair/Non-slip footwear when patient is out of bed/[place] to call system/Physically safe environment - no spills, clutter or unnecessary equipment/Purposeful Proactive Rounding/Room/bathroom lighting operational, light cord in reach

## 2022-10-23 NOTE — ED PROVIDER NOTE - NSFOLLOWUPCLINICS_GEN_ALL_ED_FT
[facility]  Surgery  [address] [place] [address]  Phone: [phone]  Fax:   Follow Up Time: 4-6 Days

## 2022-10-23 NOTE — CONSULT NOTE ADULT - SUBJECTIVE AND OBJECTIVE BOX
BRITT WILLIAM     66y Female    MRN-[id number]    Admit Date: 10-23-22  Hospital LOS:   ICU Admission Date:  OR #1-        ============================  HPI   HPI:  TRAUMA ACTIVATION LEVEL:  ALERT   ACTIVATED BY:  ED**  INTUBATED: NO**      MECHANISM OF INJURY:   [x] Fall	      GCS: 15 	E: 4	V: 5	M: 6    HPI:    66yF w/ PMHx of HTN seen as Trauma Alert  s/p unwitnessed fall, +ht, ?loc, -ac, +etoh.  The patient was BIBA after her tenant found her on the floor of her living room at the base of the stairs. The patient is accompanied by two people who stated they were at a dinner party with her, saw her about 30 minutes before the fall. The patient admits to drinking some wine, does not remember the fall, denies any pain. The patient has a laceration to the back of her head, not actively bleeding. During examination of her head and neck, several pieces of glass were pulled from her matted hair.   (23 Oct 2022 05:20)          Consults-  Consult Note Adult-Neurosurgery PA [CLARE Olsen] (10-23-22)  Consult Note Adult-Surgery Physician Assistant [ANABEL Barber] (10-23-22)      Procedure:  OR Stats  OR Time:               EBL:          IV Fluids:       Blood Products:                UOP:      Findings-         24 Hour Events  -Admission under SICU service      [X] A ten-point review of systems was otherwise negative except as noted above.  [  ] Due to altered mental status/intubation, subjective information was not attained from the patient. History was obtained, to the extent possible, from review of the chart and collateral sources of information.    =========================================================================================================================================      PMH  PAST MEDICAL & SURGICAL HISTORY:    Home Meds:  Home Medications:  benazepril-hydrochlorothiazide 20 mg-25 mg oral tablet: 1 tab(s) orally once a day (23 Oct 2022 05:22)     Allergies  Allergies    No Known Allergies    Intolerances       Current Medications:  acetaminophen     Tablet .. 650 milliGRAM(s) Oral every 6 hours  gabapentin 300 milliGRAM(s) Oral three times a day  hydrochlorothiazide 25 milliGRAM(s) Oral daily  ibuprofen  Tablet. 600 milliGRAM(s) Oral every 8 hours  lactated ringers. 1000 milliLiter(s) (125 mL/Hr) IV Continuous <Continuous>  lisinopril 20 milliGRAM(s) Oral daily      VITAL SIGNS, INS/OUTS (Last 24Hours)  ICU Vital Signs Last 24 Hrs  T(C): 36.5 (23 Oct 2022 00:32), Max: 36.5 (23 Oct 2022 00:32)  T(F): 97.7 (23 Oct 2022 00:32), Max: 97.7 (23 Oct 2022 00:32)  HR: 97 (23 Oct 2022 00:32) (97 - 97)  BP: 138/86 (23 Oct 2022 00:32) (138/86 - 138/86)  BP(mean): --  ABP: --  ABP(mean): --  RR: 17 (23 Oct 2022 00:32) (17 - 17)  SpO2: 99% (23 Oct 2022 00:32) (99% - 99%)    O2 Parameters below as of 23 Oct 2022 00:32  Patient On (Oxygen Delivery Method): room air          I&O's Summary          Physical Exam:   ----------------------------------------------------------------------------------------------------------  GCS:      Exam: A&Ox3, no focal deficits    RESPIRATORY:  Normal expansion/effort  Mechanical Ventilation    CARDIOVASCULAR:   S1/S2.  RRR  No peripheral edema    GASTROINTESTINAL:  Abdomen soft, non-tender, non-distended    MUSCULOSKELETAL:  Extremities warm, pink, well-perfused.    DERM:  No skin breakdown     :   Exam: Coe catheter in place.       Weight (kg): 68 (10-23-22)    Tubes/Lines/Drains   ----------------------------------------------------------------------------------------------------------  [x] Peripheral IV  [X] Central Venous Line          IJ/Femoral             Date Placed:    [X] Arterial Line		      Radial/Femoral    Date Placed:   [X] PICC:         	  [X] Midline		                                  Date Placed:   [X] Urinary Catheter Coe                                             Date Placed:          BRITT WILLIAM     66y Female    MRN-[id number]    Admit Date: 10-23-22       ============================  HPI   HPI:  TRAUMA ACTIVATION LEVEL:  ALERT   ACTIVATED BY:  ED**  INTUBATED: NO**      MECHANISM OF INJURY:   [x] Fall	      GCS: 15 	E: 4	V: 5	M: 6    HPI:    66yF w/ PMHx of HTN seen as Trauma Alert  s/p unwitnessed fall, +ht, ?loc, -ac, +etoh.  The patient was BIBA after her tenant found her on the floor of her living room at the base of the stairs. The patient is accompanied by two people who stated they were at a dinner party with her, saw her about 30 minutes before the fall. The patient admits to drinking some wine, does not remember the fall, denies any pain. The patient has a laceration to the back of her head, not actively bleeding. During examination of her head and neck, several pieces of glass were pulled from her matted hair.   (23 Oct 2022 05:20)    SICU consulted for evaluation of SDU.  Injuries   -Left gluteal subcutaneous hematoma measuring up to 7.1 x 1.8 cm with focus of contrast extravasation, suggesting active arterial bleed.  -Left lateral 3-8th rib cortical irregularity, suggesting acute nondisplaced fractures; no pneumothorax. Minimally displaced left posterior 7th rib fracture.  -Acute, minimally displaced lumbar L4 left transverse process fracture.  -Age-indeterminate L1 vertebral body mild compression fracture   deformity; possibly chronic.     24 Hour Events  -Admission under SICU service      [X] A ten-point review of systems was otherwise negative except as noted above.  [  ] Due to altered mental status/intubation, subjective information was not attained from the patient. History was obtained, to the extent possible, from review of the chart and collateral sources of information.    =========================================================================================================================================      PMH  PAST MEDICAL & SURGICAL HISTORY:    Home Meds:  Home Medications:  benazepril-hydrochlorothiazide 20 mg-25 mg oral tablet: 1 tab(s) orally once a day (23 Oct 2022 05:22)     Allergies  Allergies    No Known Allergies    Intolerances       Current Medications:  acetaminophen     Tablet .. 650 milliGRAM(s) Oral every 6 hours  gabapentin 300 milliGRAM(s) Oral three times a day  hydrochlorothiazide 25 milliGRAM(s) Oral daily  ibuprofen  Tablet. 600 milliGRAM(s) Oral every 8 hours  lactated ringers. 1000 milliLiter(s) (125 mL/Hr) IV Continuous <Continuous>  lisinopril 20 milliGRAM(s) Oral daily      VITAL SIGNS, INS/OUTS (Last 24Hours)  ICU Vital Signs Last 24 Hrs  T(C): 36.5 (23 Oct 2022 00:32), Max: 36.5 (23 Oct 2022 00:32)  T(F): 97.7 (23 Oct 2022 00:32), Max: 97.7 (23 Oct 2022 00:32)  HR: 97 (23 Oct 2022 00:32) (97 - 97)  BP: 138/86 (23 Oct 2022 00:32) (138/86 - 138/86)  BP(mean): --  ABP: --  ABP(mean): --  RR: 17 (23 Oct 2022 00:32) (17 - 17)  SpO2: 99% (23 Oct 2022 00:32) (99% - 99%)    O2 Parameters below as of 23 Oct 2022 00:32  Patient On (Oxygen Delivery Method): room air          I&O's Summary          Physical Exam:   ----------------------------------------------------------------------------------------------------------  GCS:      Exam: A&Ox3, no focal deficits    RESPIRATORY:  Normal expansion/effort  Mechanical Ventilation    CARDIOVASCULAR:   S1/S2.  RRR  No peripheral edema    GASTROINTESTINAL:  Abdomen soft, non-tender, non-distended    MUSCULOSKELETAL:  Extremities warm, pink, well-perfused.    DERM:  No skin breakdown     :   Exam: Coe catheter in place.       Weight (kg): 68 (10-23-22)    Tubes/Lines/Drains   ----------------------------------------------------------------------------------------------------------  [x] Peripheral IV  [X] Central Venous Line          IJ/Femoral             Date Placed:    [X] Arterial Line		      Radial/Femoral    Date Placed:   [X] PICC:         	  [X] Midline		                                  Date Placed:   [X] Urinary Catheter Coe                                             Date Placed:

## 2022-10-23 NOTE — ED ADULT TRIAGE NOTE - CHIEF COMPLAINT QUOTE
Pt came s/p fall in the house, +LOC, intoxicated, was unresponsive when EMS arrived, fire fighters had to brake the door to get in, has laceration on the back of the head, doesn't remember what happened.

## 2022-10-23 NOTE — ED PROVIDER NOTE - OBJECTIVE STATEMENT
67 y/o F with PMH HTN BIBEMS after unwitnessed fall. Pt accompanied by downstairs neighbor who heard loud noise 67 y/o F with PMH HTN BIBEMS after unwitnessed fall. Pt accompanied by downstairs neighbor who heard loud noise and went to check on patient. Found pt at bottom of long flight of stairs. Was conscious when found by neighbor. Pt does not remember events before or after fall. Pt states she had been drinking wine. Endorses head pain and bleeding from head. Denies pain elsewhere, vision change, dizziness, back pain, numbness/weakness.

## 2022-10-23 NOTE — CONSULT NOTE ADULT - SUBJECTIVE AND OBJECTIVE BOX
TRAUMA ACTIVATION LEVEL:  ALERT   ACTIVATED BY:  ED**  INTUBATED: NO**      MECHANISM OF INJURY:   [x] Fall	      GCS: 15 	E: 4	V: 5	M: 6    HPI:    66yF w/ PMHx of HTN seen as Trauma Alert  s/p unwitnessed fall, +ht, ?loc, -ac, +etoh.  The patient was BIBA after her tenant found her on the floor of her living room at the base of the stairs. The patient is accompanied by two people who stated they were at a dinner party with her, saw her about 30 minutes before the fall. The patient admits to drinking some wine, does not remember the fall, denies any pain. The patient has a laceration to the back of her head, not actively bleeding. During examination of her head and neck, several pieces of glass were pulled from her matted hair.      Trauma assessment in ED: ABCs intact , GCS 15 , AAOx3.    PAST MEDICAL & SURGICAL HISTORY:      Allergies    No Known Allergies    Intolerances        Home Medications:      ROS: 10-system review is otherwise negative except HPI above.      Primary Survey:    A - airway intact  B - bilateral breath sounds and good chest rise  C - palpable pulses in all extremities  D - GCS 15 on arrival, PAUL  Exposure obtained    Vital Signs Last 24 Hrs  T(C): 36.5 (23 Oct 2022 00:32), Max: 36.5 (23 Oct 2022 00:32)  T(F): 97.7 (23 Oct 2022 00:32), Max: 97.7 (23 Oct 2022 00:32)  HR: 97 (23 Oct 2022 00:32) (97 - 97)  BP: 138/86 (23 Oct 2022 00:32) (138/86 - 138/86)  BP(mean): --  RR: 17 (23 Oct 2022 00:32) (17 - 17)  SpO2: 99% (23 Oct 2022 00:32) (99% - 99%)    Parameters below as of 23 Oct 2022 00:32  Patient On (Oxygen Delivery Method): room air        Secondary Survey:   General: NAD  HEENT: Normocephalic, , EOMI, PEERLA. no scalp lacerations, noted- the patient has thick hair and it was very matted. several small pieces of glass removed from hair. No active bleeding noted. No tenderness.  Neck: C collar in place. Soft, midline trachea. no c-spine tenderness  Chest: No chest wall tenderness, no subcutaneous emphysema   Cardiac: S1, S2, RRR  Respiratory: Bilateral breath sounds, clear and equal bilaterally  Abdomen: Soft, non-distended, non-tender, no rebound, no guarding.  Groin: Normal appearing, pelvis stable   Ext:  Moving b/l upper and lower extremities. Palpable Radial b/l UE, b/l DP palpable in LE.   Back: No T/L/S spine tenderness, No palpable runoff/stepoff/deformity      FAST: PENDING    ACCESS / DEVICES:  [X ] Peripheral IV      Labs:  CAPILLARY BLOOD GLUCOSE      POCT Blood Glucose.: 102 mg/dL (23 Oct 2022 00:48)  POCT Blood Glucose.: 105 mg/dL (23 Oct 2022 00:36)                          13.1   7.55  )-----------( 276      ( 23 Oct 2022 00:53 )             37.8             RADIOLOGY & ADDITIONAL STUDIES: PENDING  ---------------------------------------------------------------------------------------     TRAUMA ACTIVATION LEVEL:  ALERT   ACTIVATED BY:  ED**  INTUBATED: NO**      MECHANISM OF INJURY:   [x] Fall	      GCS: 15 	E: 4	V: 5	M: 6    HPI:    66yF w/ PMHx of HTN seen as Trauma Alert  s/p unwitnessed fall, +ht, ?loc, -ac, +etoh.  The patient was BIBA after her tenant found her on the floor of her living room at the base of the stairs. The patient is accompanied by two people who stated they were at a dinner party with her, saw her about 30 minutes before the fall. The patient admits to drinking some wine, does not remember the fall, denies any pain. The patient has a laceration to the back of her head, not actively bleeding. During examination of her head and neck, several pieces of glass were pulled from her matted hair.      Trauma assessment in ED: ABCs intact , GCS 15 , AAOx3.    PAST MEDICAL & SURGICAL HISTORY:      Allergies    No Known Allergies    Intolerances        Home Medications:      ROS: 10-system review is otherwise negative except HPI above.      Primary Survey:    A - airway intact  B - bilateral breath sounds and good chest rise  C - palpable pulses in all extremities  D - GCS 15 on arrival, PAUL  Exposure obtained    Vital Signs Last 24 Hrs  T(C): 36.5 (23 Oct 2022 00:32), Max: 36.5 (23 Oct 2022 00:32)  T(F): 97.7 (23 Oct 2022 00:32), Max: 97.7 (23 Oct 2022 00:32)  HR: 97 (23 Oct 2022 00:32) (97 - 97)  BP: 138/86 (23 Oct 2022 00:32) (138/86 - 138/86)  BP(mean): --  RR: 17 (23 Oct 2022 00:32) (17 - 17)  SpO2: 99% (23 Oct 2022 00:32) (99% - 99%)    Parameters below as of 23 Oct 2022 00:32  Patient On (Oxygen Delivery Method): room air        Secondary Survey:   General: NAD  HEENT: Normocephalic, , EOMI, PEERLA. no scalp lacerations, noted- the patient has thick hair and it was very matted. several small pieces of glass removed from hair. No active bleeding noted. No tenderness.  Neck: C collar in place. Soft, midline trachea. no c-spine tenderness  Chest: No chest wall tenderness, no subcutaneous emphysema   Cardiac: S1, S2, RRR  Respiratory: Bilateral breath sounds, clear and equal bilaterally  Abdomen: Soft, non-distended, non-tender, no rebound, no guarding.  Groin: Normal appearing, pelvis stable   Ext:  Moving b/l upper and lower extremities. Palpable Radial b/l UE, b/l DP palpable in LE.   Back: No T/L/S spine tenderness, No palpable runoff/stepoff/deformity      ACCESS / DEVICES:  [X ] Peripheral IV      Labs:  CAPILLARY BLOOD GLUCOSE      POCT Blood Glucose.: 102 mg/dL (23 Oct 2022 00:48)  POCT Blood Glucose.: 105 mg/dL (23 Oct 2022 00:36)                          13.1   7.55  )-----------( 276      ( 23 Oct 2022 00:53 )             37.8       10-23    136  |  96<L>  |  14  ----------------------------<  105<H>  4.4   |  24  |  0.7    Ca    9.4      23 Oct 2022 00:53    TPro  7.0  /  Alb  4.5  /  TBili  0.4  /  DBili  x   /  AST  47<H>  /  ALT  37  /  AlkPhos  73  10-23          RADIOLOGY & ADDITIONAL STUDIES:   < from: CT Head No Cont (10.23.22 @ 01:34) >  IMPRESSION:  IMPRESSION CT HEAD:  No evidence of acute intracranial abnormality.  IMPRESSION CT MAXILLOFACIAL:  No evidence of acute fracture or dislocation.    --- End of Report ---    < end of copied text >  < from: CT Cervical Spine No Cont (10.23.22 @ 01:43) >  Impression:    No evidence of acute fracture of the cervical spine.    --- End of Report ---    < end of copied text >  < from: CT Chest w/ IV Cont (10.23.22 @ 02:11) >  IMPRESSION:    1. Left gluteal subcutaneous hematoma measuring up to 7.1 x 1.8 cm with   focus of contrast extravasation, suggesting active arterial bleed.    2. Left lateral 3-8th rib cortical irregularity, suggesting acute   nondisplaced fractures; no pneumothorax. Minimally displaced left   posterior 7th rib fracture.    3. Acute, minimally displaced lumbar L4 left transverse process fracture.    4. Age-indeterminate L1 vertebral body mild compression fracture   deformity; possibly chronic.    --- End of Report ---    < end of copied text >    ---------------------------------------------------------------------------------------

## 2022-10-23 NOTE — CONSULT NOTE ADULT - SUBJECTIVE AND OBJECTIVE BOX
HPI:  TRAUMA ACTIVATION LEVEL:  ALERT   ACTIVATED BY:  ED**  INTUBATED: NO**      MECHANISM OF INJURY:   [x] Fall	      GCS: 15 	E: 4	V: 5	M: 6    HPI:    66yF w/ PMHx of HTN seen as Trauma Alert  s/p unwitnessed fall, +ht, ?loc, -ac, +etoh.  The patient was BIBA after her tenant found her on the floor of her living room at the base of the stairs. The patient is accompanied by two people who stated they were at a dinner party with her, saw her about 30 minutes before the fall. The patient admits to drinking some wine, does not remember the fall, denies any pain. The patient has a laceration to the back of her head, not actively bleeding. During examination of her head and neck, several pieces of glass were pulled from her matted hair.   (23 Oct 2022 05:20)      PAST MEDICAL & SURGICAL HISTORY:      Hospital Course: Stable. CT head and neck WNL found to have: Injuries identified:   -Left gluteal subcutaneous hematoma measuring up to 7.1 x 1.8 cm with   focus of contrast extravasation, suggesting active arterial bleed.  - Left lateral 3-8th rib cortical irregularity, suggesting acute   nondisplaced fractures; no pneumothorax. Minimally displaced left   posterior 7th rib fracture.  - Acute, minimally displaced lumbar L4 left transverse process fracture.  -Age-indeterminate L1 vertebral body mild compression fracture   deformity; possibly chronic.    TODAY'S SUBJECTIVE & REVIEW OF SYMPTOMS: Feeling well. She states that she walked to the bathroom with supervision of nursing. She refused PT today.      Constitutional WNL   Cardio WNL   Resp WNL. No SOB   GI WNL  Heme WNL  Endo WNL  Skin WNL  MSK see HPI  Neuro WNL  Cognitive WNL  Psych WNL      MEDICATIONS  (STANDING):  acetaminophen     Tablet .. 650 milliGRAM(s) Oral every 6 hours  gabapentin 300 milliGRAM(s) Oral three times a day  hydrochlorothiazide 25 milliGRAM(s) Oral daily  lisinopril 20 milliGRAM(s) Oral daily    MEDICATIONS  (PRN):      FAMILY HISTORY:      Allergies    No Known Allergies    Intolerances        SOCIAL HISTORY:    [ x ] Etoh  [  ] Smoking  [  ] Substance abuse     Home Environment:  [ x Home Alone  [  ] Lives with Family  [  ] Home Health Aid    Dwelling:  [  ] Apartment  [ x ] Private House  [  ] Adult Home  [  ] Skilled Nursing Facility      [  ] Short Term  [  ] Long Term  [  ] Stairs       Elevator [  ]    FUNCTIONAL STATUS PTA: (Check all that apply)  Ambulation: [ x  ]Independent   [   ] Requires Assistance   [  ] Dependent     [  ] Non-Ambulatory       Assistive Device: [  ] SA Cane  [  ]  Q Cane  [  ] Walker  [  ]  Wheelchair  ADL : [x  ] Independent    [   ] Requires Assistance    [  ]  Dependent       Vital Signs Last 24 Hrs  T(C): 36.5 (23 Oct 2022 00:32), Max: 36.5 (23 Oct 2022 00:32)  T(F): 97.7 (23 Oct 2022 00:32), Max: 97.7 (23 Oct 2022 00:32)  HR: 93 (23 Oct 2022 12:45) (90 - 100)  BP: 103/54 (23 Oct 2022 12:45) (103/54 - 138/86)  BP(mean): 76 (23 Oct 2022 12:45) (76 - 88)  RR: 18 (23 Oct 2022 09:00) (16 - 18)  SpO2: 99% (23 Oct 2022 12:45) (97% - 100%)    Parameters below as of 23 Oct 2022 09:00  Patient On (Oxygen Delivery Method): room air          PHYSICAL EXAM: Alert & Oriented X3  GENERAL: NAD, well-groomed, well-developed  HEAD:  Occipital laceration. Periorbital ecchymosis  EYES: EOMI, PERRL  NECK: Supple  HEART: Regular rate and rhythm  ABDOMEN: Soft, Nontender, Nondistended  EXTREMITIES:  No clubbing, cyanosis, or edema  ROM:  [x   ] WFL all extremities  [  ] Abnormal    NERVOUS SYSTEM:   Cranial Nerves 2-12: [x   ] intact  [  ] Abnormal   Motor Strength: [ x  ] WFL all extremities   [  ] Abnormal   Sensation: [ x  ] intact to light touch  [  ] Abnormal   Reflexes: [   ] Symmetric  [  ]  Abnormal     FUNCTIONAL STATUS: deferred  Bed Mobility: [   ]Independent  [  ] Supervision  [  ] Needs Assistance   [  ] N/A   Transfers: [   ] Independent  [  ] Supervision  [  ] Needs Assistance  [  ]  N/A   Ambulation: [   ] Independent  [  ] Supervision  [  ] Needs Assistance  [  ] N/A   ADL: [   ] Independent  [  ] Requires Assistance  [  ] N/A       LABS:                        10.9   5.58  )-----------( 223      ( 23 Oct 2022 13:39 )             31.0     10-23    134<L>  |  95<L>  |  16  ----------------------------<  115<H>  4.0   |  26  |  0.8    Ca    8.9      23 Oct 2022 13:39  Phos  4.3     10-23  Mg     1.9     10-23    TPro  7.0  /  Alb  4.5  /  TBili  0.4  /  DBili  x   /  AST  47<H>  /  ALT  37  /  AlkPhos  73  10-23    PT/INR - ( 23 Oct 2022 00:55 )   PT: 9.90 sec;   INR: 0.87 ratio         PTT - ( 23 Oct 2022 00:55 )  PTT:26.8 sec      RADIOLOGY & ADDITIONAL STUDIES:  < from: CT Abdomen and Pelvis w/ IV Cont (10.23.22 @ 02:11) >  IMPRESSION:    1. Left gluteal subcutaneous hematoma measuring up to 7.1 x 1.8 cm with   focus of contrast extravasation, suggesting active arterial bleed.    2. Left lateral 3-8th rib cortical irregularity, suggesting acute   nondisplaced fractures; no pneumothorax. Minimally displaced left   posterior 7th rib fracture.    3. Acute, minimally displaced lumbar L4 left transverse process fracture.    4. Age-indeterminate L1 vertebral body mild compression fracture   deformity; possibly chronic.    < end of copied text >            Assesment:

## 2022-10-23 NOTE — H&P ADULT - NSHPADDITIONALINFOADULT_GEN_ALL_CORE
Case discussed with surgery trauma resident and CT findings of acute fractures reviewed patient early wanted to sign out AMA but finally realized that she can not ambulate and was hospitalized for further evaluation and rehab

## 2022-10-23 NOTE — ED PROVIDER NOTE - NSFOLLOWUPINSTRUCTIONS_ED_ALL_ED_FT
Follow up with surgery clinic. Contact information provided above.    Return to ED or clinic in 10-14 days for staple removal. When you wash your hair do not scrub.

## 2022-10-23 NOTE — H&P ADULT - NSHPPOAPULMEMBOLUS_GEN_A_CORE
Chief Complaint   Patient presents with   • Annual Wellness Visit         HPI:  Kacey is a 70 y.o. here for Medicare Annual Wellness Visit    Patient Active Problem List    Diagnosis Date Noted   • Age-related osteoporosis without current pathological fracture 06/26/2017   • Vitamin D deficiency 03/30/2017   • Type 2 diabetes mellitus without complication, without long-term current use of insulin (HCC) 10/14/2016   • Hypercholesteremia 10/14/2016   • HTN (hypertension) 03/06/2015   • Aortic stenosis 03/06/2015   • Hearing loss 03/06/2015       Current Outpatient Prescriptions   Medication Sig Dispense Refill   • metFORMIN ER (GLUCOPHAGE XR) 500 MG TABLET SR 24 HR TAKE ONE TABLET BY MOUTH ONCE DAILY 90 Tab 1   • lisinopril (PRINIVIL) 10 MG Tab TAKE ONE TABLET BY MOUTH ONCE DAILY 90 Tab 3   • lovastatin (MEVACOR) 10 MG tablet TAKE ONE TABLET BY MOUTH AT BEDTIME 90 Tab 3   • Calcium Citrate-Vitamin D (CALCIUM + D PO) Take  by mouth.     • therapeutic multivitamin-minerals (THERAGRAN-M) Tab Take 1 Tab by mouth every day.     • Blood Glucose Monitoring Suppl SUPPLIES Misc Test strips order: Test strips for Meter that's covered by insurance. Sig: use tid and prn ssx high or low sugar #100 RF x 3 100 Strip 11   • Lancets Misc Lancets order: Lancets  Sig: use bid and prn ssx high or low sugar. #100 RF x 3 100 Each 11   • Blood Glucose Monitoring Suppl Device Meter: Dispense Device of Insurance Preference. Sig. Use as directed for blood sugar monitoring. #1. NR. 1 Device 1   • aspirin 81 MG tablet Take 1 Tab by mouth every day. 100 Tab 3     No current facility-administered medications for this visit.         Patient is taking medications as noted in medication list.  Current supplements as per medication list.     Allergies: Latex    Current social contact/activities: spends time with boy friend and friends      Is patient current with immunizations? No, due for FLU, TDAP and SHINGRIX (Shingles). Patient is interested in  receiving FLU today.    She  reports that she has never smoked. She has never used smokeless tobacco. She reports that she does not drink alcohol or use drugs.  Counseling given: Not Answered        DPA/Advanced directive: Patient does not have an Advanced Directive.  A packet and workshop information was given on Advanced Directives.    ROS:    Gait: Uses no assistive device   Ostomy: No   Other tubes: No   Amputations: No   Chronic oxygen use No   Last eye exam 2016   Wears hearing aids: No   : Reports urinary leakage during the last 6 months that has somewhat interfered with their daily activities or sleep.      Screening:    DIABETES    Has patient ever had diabetes education? No, patient is NOT interested.      Depression Screening    Little interest or pleasure in doing things?     Feeling down, depressed, or hopeless?    Trouble falling or staying asleep, or sleeping too much?     Feeling tired or having little energy?     Poor appetite or overeating?     Feeling bad about yourself - or that you are a failure or have let yourself or your family down?    Trouble concentrating on things, such as reading the newspaper or watching television?    Moving or speaking so slowly that other people could have noticed.  Or the opposite - being so fidgety or restless that you have been moving around a lot more than usual?     Thoughts that you would be better off dead, or of hurting yourself?     Patient Health Questionnaire Score:        If depressive symptoms identified deferred to follow up visit unless specifically addressed in assessment and plan.    Interpretation of PHQ-9 Total Score   Score Severity   1-4 No Depression   5-9 Mild Depression   10-14 Moderate Depression   15-19 Moderately Severe Depression   20-27 Severe Depression      Screening for Cognitive Impairment    Three Minute Recall (leader, season, table)  2 /3    Draw clock face with all 12 numbers and set the hands to show 10 past 11.    yes   If  cognitive concerns identified, deferred for follow up unless specifically addressed in assessment and plan.    Fall Risk Assessment    Has the patient had two or more falls in the last year or any fall with injury in the last year?   0  If fall risk identified, deferred for follow up unless specifically addressed in assessment and plan.      Safety Assessment    Throw rugs on floor.   No  Handrails on all stairs. no    Good lighting in all hallways.   yes  Difficulty hearing.   yes  Patient counseled about all safety risks that were identified.    Functional Assessment ADLs    Are there any barriers preventing you from cooking for yourself or meeting nutritional needs?   . no  Are there any barriers preventing you from driving safely or obtaining transportation?   .  no  Are there any barriers preventing you from using a telephone or calling for help?   .  no  Are there any barriers preventing you from shopping?   .    Are there any barriers preventing you from taking care of your own finances?   .  no  Are there any barriers preventing you from managing your medications?     .  no  Are there any barriers preventing you from showering, bathing or dressing yourself?   .  no  Are you currently engaging in any exercise or physical activity?   .     What is your perception of your health?   .walks daily and view on health is good     Health Maintenance Summary                IMM HEP B VACCINE Overdue 10/12/1966     IMM DTaP/Tdap/Td Vaccine Overdue 10/12/1966     COLONOSCOPY Overdue 10/12/1997     IMM ZOSTER VACCINES Overdue 10/12/1997     RETINAL SCREENING Overdue 11/30/2017      Done 11/30/2016 REFERRAL FOR RETINAL SCREENING EXAM    Annual Wellness Visit Overdue 12/16/2017      Done 12/15/2016 Visit Dx: Medicare annual wellness visit, initial    IMM INFLUENZA Overdue 9/1/2018      Done 9/11/2017 Imm Admin: Influenza Vaccine Adult HD     Patient has more history with this topic...    A1C SCREENING Next Due 11/14/2018       Done 5/14/2018 HEMOGLOBIN A1C      Patient has more history with this topic...    MAMMOGRAM Next Due 2/14/2019      Done 2/14/2017 MA-MAMMO SCREENING BILAT W/TOMOSYNTHESIS W/CAD    FASTING LIPID PROFILE Next Due 5/14/2019      Done 5/14/2018 LIPID PROFILE     Patient has more history with this topic...    URINE ACR / MICROALBUMIN Next Due 5/14/2019      Done 5/14/2018 MICROALBUMIN CREAT RATIO URINE      Patient has more history with this topic...    SERUM CREATININE Next Due 5/14/2019      Done 5/14/2018 COMP METABOLIC PANEL      Patient has more history with this topic...    DIABETES MONOFILAMENT / LE EXAM Next Due 5/22/2019      Done 5/22/2018 AMB DIABETIC MONOFILAMENT LOWER EXTREMITY EXAM     Patient has more history with this topic...    BONE DENSITY Next Due 2/14/2022      Done 2/14/2017 DS-BONE DENSITY STUDY (DEXA)          Patient Care Team:  Khadijah Villagomez M.D. as PCP - General (Internal Medicine)  Hi Medina M.D. as Consulting Physician (Ophthalmology)  Zaire Brown M.D. (Otolaryngology)      Social History   Substance Use Topics   • Smoking status: Never Smoker   • Smokeless tobacco: Never Used   • Alcohol use No     Family History   Problem Relation Age of Onset   • Cancer Mother         breast cancer   • Heart Disease Father 46        MI   • Heart Attack Father    • Heart Disease Sister         2 heart surgery   • Diabetes Brother    • Cancer Brother         brain tumor   • Heart Disease Brother         HEART STENT   • Diabetes Brother    • Diabetes Brother      She  has a past medical history of Diabetes (HCC); Hearing loss; Hyperlipidemia; and Hypertension.   Past Surgical History:   Procedure Laterality Date   • PRIMARY C SECTION      3 c section   • TONSILLECTOMY           Exam:     not currently breastfeeding. There is no height or weight on file to calculate BMI.    Hearing fair.    Dentition good  Alert, oriented in no acute distress.  Eye contact is good, speech goal directed, affect  calm      Assessment and Plan. The following treatment and monitoring plan is recommended:    HTN (hypertension)  BP well managed on lisinopril 10 ,mg daily. She denies headache, chest pain, sob, leg swelling, lightheaded, sob, palpitations     Aortic stenosis  Echocardiogram mild aortic stenosis.04/27/2017 . Not symptomatic  She tells me that she is feeling great. Her palpitations resolved. She has had stress test normal    CONCLUSIONS  No prior study is available for comparison.   Normal left ventricular chamber size. Normal left ventricular wall   thickness. Normal left ventricular systolic function. Left ventricular   ejection fraction is visually estimated to be 60%. Normal regional wall   motion. Grade I diastolic dysfunction.  Mildly calcified aortic valve leaflets. Mild aortic stenosis.   Transvalvular gradients are - Peak: 18 mmHg, Mean: 11 mmHg.   Dimensionless index is (0.50). No aortic insufficiency    Hearing loss  Chronic, stable, slight worse. Cannot afford hearing aids     Type 2 diabetes mellitus without complication, without long-term current use of insulin (CMS-HCC)  She is on metformin 500 mg daily. Last a1c 6.4. No complications.She is on statin, ace, asa.   She is up to date with testing. Not able to perform retinal screening today. I advise to follow up with optometrist for continue to monitor      Hypercholesteremia  On statin for primary prevention. Last lab 05/2018. No side effects from statin. Continue to monitor     Vitamin D deficiency  On supplement, continue to monitor     Age-related osteoporosis without current pathological fracture  She cannot tolerate bisphosphonate due to worsening of acid reflux. Last DEXA scan 02/2017. Continue vitamin d supplement, continue to monitor       Services suggested: No services needed at this time  Health Care Screening recommendations as per orders if indicated.  Referrals offered: PT/OT/Nutrition counseling/Behavioral Health/Smoking cessation as  per orders if indicated.    Discussion today about general wellness and lifestyle habits:    · Prevent falls and reduce trip hazards; Cautioned about securing or removing rugs.  · Have a working fire alarm and carbon monoxide detector;   · Engage in regular physical activity and social activities       Follow-up: Return in about 6 months (around 4/1/2019), or if symptoms worsen or fail to improve, for Short.   no

## 2022-10-23 NOTE — H&P ADULT - NSHPPHYSICALEXAM_GEN_ALL_CORE
Primary Survey:    A - airway intact  B - bilateral breath sounds and good chest rise  C - palpable pulses in all extremities  D - GCS 15 on arrival, PAUL  Exposure obtained    Vital Signs Last 24 Hrs  T(C): 36.5 (23 Oct 2022 00:32), Max: 36.5 (23 Oct 2022 00:32)  T(F): 97.7 (23 Oct 2022 00:32), Max: 97.7 (23 Oct 2022 00:32)  HR: 97 (23 Oct 2022 00:32) (97 - 97)  BP: 138/86 (23 Oct 2022 00:32) (138/86 - 138/86)  BP(mean): --  RR: 17 (23 Oct 2022 00:32) (17 - 17)  SpO2: 99% (23 Oct 2022 00:32) (99% - 99%)    Parameters below as of 23 Oct 2022 00:32  Patient On (Oxygen Delivery Method): room air        Secondary Survey:   General: NAD  HEENT: Normocephalic, , EOMI, PEERLA. 3 cm posterior scalp laceration, noted- the patient has thick hair and it was very matted. several small pieces of glass removed from hair. No active bleeding noted. No tenderness.  Neck: C collar in place. Soft, midline trachea. no c-spine tenderness  Chest: No chest wall tenderness, no subcutaneous emphysema   Cardiac: S1, S2, RRR  Respiratory: Bilateral breath sounds, clear and equal bilaterally  Abdomen: Soft, non-distended, non-tender, no rebound, no guarding.  Groin: Normal appearing, pelvis stable   Ext:  Moving b/l upper and lower extremities. Palpable Radial b/l UE, b/l DP palpable in LE. No signs of hematoma to buttock on exam.  Back: No T/L/S spine tenderness, No palpable runoff/stepoff/deformity

## 2022-10-23 NOTE — CONSULT NOTE ADULT - ASSESSMENT
66y Female  HD s/p +HT ?LOC -AC +ETOH    Injuries   -Left gluteal subcutaneous hematoma measuring up to 7.1 x 1.8 cm with focus of contrast extravasation, suggesting active arterial bleed.  -Left lateral 3-8th rib cortical irregularity, suggesting acute nondisplaced fractures; no pneumothorax. Minimally displaced left posterior 7th rib fracture.  -Acute, minimally displaced lumbar L4 left transverse process fracture.  -Age-indeterminate L1 vertebral body mild compression fracture   deformity; possibly chronic.    NEURO:  #Acute pain    -APAP prn, Gabapentin 300mg q8    -ibuprofen  Tablet. 600 milliGRAM(s) Oral every 8 hours  #acute L4, age indeterminate L1 compression fx    RESP:   #Oxygenation    -wean off NC to RA as tolerated  #Activity    -increase as tolerated    CARDS:   #hx of HTN  hydrochlorothiazide 25 daily restarted  lisinopril 20 daily restarted      GI/NUTR:   #Diet, Regular   -aspiration precautions, HOB 30  #GI Prophylaxis    -not indicated  #Bowel regimen    -senna/miralax    /RENAL:   #urine output in critically ill    -indwelling hernandes (placed     Labs:          BUN/Cr- 14/0.7  -->          Electrolytes-Na 136 // K 4.4 // Mg -- //  Phos -- (10-23 @ 00:53)       HEME/ONC:   #DVT prophylaxis,SCDs    Labs: Hb/Hct:  13.1/37.8  -->                      Plts:  276  -->                 PTT/INR:  26.8/0.87  --->       ID:  #leukocytosis   WBC- 7.55  --->>  Temp trend- 24hrs T(F): 97.7 (10-23 @ 00:32), Max: 97.7 (10-23 @ 00:32)    ENDO:    -FSG q6 if NPO or Tube feeds    -Glucose goal 140-180    -if above 180 start ISS     HA1C     MSK:     Activity - Ambulate as Tolerated:     LINES/DRAINS:  PIV, Hernandes    ADVANCED DIRECTIVES:  Full Code    INDICATION FOR SDU: SYNCOPE; LACERATION OF HEAD; HEAD INJURY; RIB FRACTURE; LUMBAR COMPRESSION    DISPO:   Case discussed with attending Dr. Valdez 66y Female  HD s/p +HT ?LOC -AC +ETOH    Injuries   -Left gluteal subcutaneous hematoma measuring up to 7.1 x 1.8 cm with focus of contrast extravasation, suggesting active arterial bleed.  -Left lateral 3-8th rib cortical irregularity, suggesting acute nondisplaced fractures; no pneumothorax. Minimally displaced left posterior 7th rib fracture.  -Acute, minimally displaced lumbar L4 left transverse process fracture.  -Age-indeterminate L1 vertebral body mild compression fracture   deformity; possibly chronic.    NEURO:  #Acute pain    -APAP prn, Gabapentin 300mg q8  #acute L4, age indeterminate L1 compression fx    -no acute NSGY intervention    RESP:   #acute rib fx  -encourage incentive spirometer  #Oxygenation    -wean off NC to RA as tolerated  #Activity    -increase as tolerated    CARDS:   #hx of HTN  hydrochlorothiazide 25 daily restarted  lisinopril 20 daily restarted  LR @125cc/hr    GI/NUTR:   #Diet, Regular   -aspiration precautions, HOB 30  #GI Prophylaxis    -not indicated  #Bowel regimen    -senna    /RENAL:   #urine output in critically ill    -indwelling hernandes (placed     Labs:          BUN/Cr- 14/0.7  -->          Electrolytes-Na 136 // K 4.4 // Mg -- //  Phos -- (10-23 @ 00:53)       HEME/ONC:   #left gluteal hematoma    -trend q6 CBC 11am  #DVT prophylaxis,SCDs    Labs: Hb/Hct:  13.1/37.8  -->                      Plts:  276  -->                 PTT/INR:  26.8/0.87  --->       ID:  WBC- 7.55  --->>  Temp trend- 24hrs T(F): 97.7 (10-23 @ 00:32), Max: 97.7 (10-23 @ 00:32)    ENDO:    -FSG q6 if NPO    -Glucose goal 140-180    -if above 180 start ISS     HA1C     MSK:     Activity - Ambulate as Tolerated:     LINES/DRAINS:  PIV, Hernandes    ADVANCED DIRECTIVES:  Full Code    INDICATION FOR SDU: SYNCOPE; LACERATION OF HEAD; HEAD INJURY; RIB FRACTURE; LUMBAR COMPRESSION    DISPO:   Case discussed with attending Dr. Valdez 66y Female  HD s/p +HT ?LOC -AC +ETOH    Injuries   -Left gluteal subcutaneous hematoma measuring up to 7.1 x 1.8 cm with focus of contrast extravasation, suggesting active arterial bleed.  -Left lateral 3-8th rib cortical irregularity, suggesting acute nondisplaced fractures; no pneumothorax. Minimally displaced left posterior 7th rib fracture.  -Acute, minimally displaced lumbar L4 left transverse process fracture.  -Age-indeterminate L1 vertebral body mild compression fracture   deformity; possibly chronic.    NEURO:  #Acute pain    -APAP prn, Gabapentin 300mg q8  #acute L4, age indeterminate L1 compression fx    -no acute NSGY intervention    RESP:   #acute rib fx  -encourage incentive spirometer  #Oxygenation    -wean off NC to RA as tolerated  #Activity    -increase as tolerated    CARDS:   #hx of HTN  hydrochlorothiazide 25 daily restarted  lisinopril 20 daily restarted  LR @125cc/hr    GI/NUTR:   #Diet, Regular   -aspiration precautions, HOB 30  #GI Prophylaxis    -not indicated  #Bowel regimen    -senna    /RENAL:   #urine output in critically ill    -indwelling hernandes (placed     Labs:          BUN/Cr- 14/0.7  -->          Electrolytes-Na 136 // K 4.4 // Mg -- //  Phos -- (10-23 @ 00:53)       HEME/ONC:   #left gluteal hematoma    -trend q6 CBC 11am  #DVT prophylaxis,SCDs    Labs: Hb/Hct:  13.1/37.8  -->                      Plts:  276  -->                 PTT/INR:  26.8/0.87  --->       ID:  WBC- 7.55  --->>  Temp trend- 24hrs T(F): 97.7 (10-23 @ 00:32), Max: 97.7 (10-23 @ 00:32)    ENDO:    -FSG q6 if NPO    -Glucose goal 140-180    -if above 180 start ISS     HA1C     MSK:     Activity - Ambulate as Tolerated:     LINES/DRAINS:  PIV, Hernandes    ADVANCED DIRECTIVES:  Full Code    INDICATION FOR SDU: SYNCOPE; LACERATION OF HEAD; HEAD INJURY; RIB FRACTURE; LUMBAR COMPRESSION    DISPO:   Case discussed with attending Dr. Valdez  d/tunde San  -f/u IR and CBC

## 2022-10-23 NOTE — H&P ADULT - NSHPLABSRESULTS_GEN_ALL_CORE
POCT Blood Glucose.: 102 mg/dL (23 Oct 2022 00:48)  POCT Blood Glucose.: 105 mg/dL (23 Oct 2022 00:36)                          13.1   7.55  )-----------( 276      ( 23 Oct 2022 00:53 )             37.8       10-23    136  |  96<L>  |  14  ----------------------------<  105<H>  4.4   |  24  |  0.7    Ca    9.4      23 Oct 2022 00:53    TPro  7.0  /  Alb  4.5  /  TBili  0.4  /  DBili  x   /  AST  47<H>  /  ALT  37  /  AlkPhos  73  10-23          RADIOLOGY & ADDITIONAL STUDIES:   < from: CT Head No Cont (10.23.22 @ 01:34) >  IMPRESSION:  IMPRESSION CT HEAD:  No evidence of acute intracranial abnormality.  IMPRESSION CT MAXILLOFACIAL:  No evidence of acute fracture or dislocation.    --- End of Report ---    < end of copied text >  < from: CT Cervical Spine No Cont (10.23.22 @ 01:43) >  Impression:    No evidence of acute fracture of the cervical spine.    --- End of Report ---    < end of copied text >  < from: CT Chest w/ IV Cont (10.23.22 @ 02:11) >  IMPRESSION:    1. Left gluteal subcutaneous hematoma measuring up to 7.1 x 1.8 cm with   focus of contrast extravasation, suggesting active arterial bleed.    2. Left lateral 3-8th rib cortical irregularity, suggesting acute   nondisplaced fractures; no pneumothorax. Minimally displaced left   posterior 7th rib fracture.    3. Acute, minimally displaced lumbar L4 left transverse process fracture.    4. Age-indeterminate L1 vertebral body mild compression fracture   deformity; possibly chronic.    --- End of Report ---    < end of copied text >

## 2022-10-23 NOTE — CONSULT NOTE ADULT - ASSESSMENT
ASSESSMENT:  66y Female  w/ PMHx of HTN seen as  Trauma Alert  s/p unwitnessed fall, +ht,?loc, -ac, +etoh with no complaints of pain , external signs of trauma include ?posterior scalp laceration- dried blood on face and matted hair d/t dried blood- no laceration found. Trauma assessment in ED: ABCs intact , GCS 15 , AAOx3,  PAUL.     Injuries identified:   - pending  -   -     PLAN:   - Trauma Labs: (CBC, BMP, Coags, T&S, UA, EtOH level)  -Pan scan, CT max/face  -CXR, Pelvic xray  -FAST  -Wash out and examine posterior scalp after scans for more thorough examination of scalp    Disposition pending results of above labs and imaging  Above plan discussed with Trauma attending, Dr. Robison , patient, patient family, and ED team  --------------------------------------------------------------------------------------  10-23-22 @ 01:16 ASSESSMENT:  66y Female  w/ PMHx of HTN seen as  Trauma Alert  s/p unwitnessed fall, +ht,?loc, -ac, +etoh with no complaints of pain , external signs of trauma include ?posterior scalp laceration- dried blood on face and matted hair d/t dried blood- no laceration found. Trauma assessment in ED: ABCs intact , GCS 15 , AAOx3,  PAUL.     Injuries identified:   -Left gluteal subcutaneous hematoma measuring up to 7.1 x 1.8 cm with   focus of contrast extravasation, suggesting active arterial bleed.  - Left lateral 3-8th rib cortical irregularity, suggesting acute   nondisplaced fractures; no pneumothorax. Minimally displaced left   posterior 7th rib fracture.  - Acute, minimally displaced lumbar L4 left transverse process fracture.  -Age-indeterminate L1 vertebral body mild compression fracture   deformity; possibly chronic.    PLAN:   - Trauma Labs: (CBC, BMP, Coags, T&S, UA, EtOH level)  -Pan scan, CT max/face  -CXR, Pelvic xray  -FAST  -Wash out and examine posterior scalp after scans for more thorough examination of scalp    Senior Addendum: Patient seen and examined by me. Case discussed at length with attending surgeon, and plan above as per our conversation. See H&P    Above plan discussed with Trauma attending, Dr. Robison , patient, patient family, and ED team  --------------------------------------------------------------------------------------  10-23-22 @ 01:16

## 2022-10-23 NOTE — CONSULT NOTE ADULT - CONSULT REASON
fall/ multiple trauma
L1 compression fracture, L4 TP fracture
Medical management
S/P unwitnessed fall, +ht, ?loc, -ac, +etoh
s/p fall, +HT, ?LOC   -Left gluteal subcutaneous hematoma measuring up to 7.1 x 1.8 cm with   focus of contrast extravasation, suggesting active arterial bleed.  - Left lateral 3-8th rib cortical irregularity, suggesting acute   nondisplaced fractures; no pneumothorax. Minimally displaced left   posterior 7th rib fracture.  - Acute, minimally displaced lumbar L4 left transverse process fracture.  -Age-indeterminate L1 vertebral body mild compression fracture   deformity; possibly chronic.

## 2022-10-23 NOTE — ED PROVIDER NOTE - NS ED ROS FT
Constitutional: No fever  Eyes:  No visual changes, eye pain, or discharge.  ENMT:  No hearing changes, ear pain, sore throat, runny nose, or difficulty swallowing  Cardiac:  No chest pain, SOB or edema. No chest pain with exertion.  Respiratory:  No cough or respiratory distress.   GI:  No nausea, vomiting, diarrhea or abdominal pain.  :  No dysuria, frequency or burning.  MS:  No myalgia, muscle weakness, joint pain or back pain.  Neuro: +headache. No weakness.  No LOC.  Skin:  No skin rash.

## 2022-10-23 NOTE — ED PROVIDER NOTE - CLINICAL SUMMARY MEDICAL DECISION MAKING FREE TEXT BOX
66-year-old female presented today as a trauma alert secondary to mechanism.  Patient is hemodynamically stable upon evaluation.  Patient is neurologically intact at this time however clinically intoxicated.  Trauma team evaluated patient at bedside.  Patient is CT scans performed which were indicative of multiple rib fractures.  Patient will be admitted for further evaluation and care under trauma team.

## 2022-10-24 ENCOUNTER — TRANSCRIPTION ENCOUNTER (OUTPATIENT)
Age: 67
End: 2022-10-24

## 2022-10-24 VITALS — RESPIRATION RATE: 23 BRPM | HEART RATE: 79 BPM | OXYGEN SATURATION: 100 %

## 2022-10-24 LAB
ANION GAP SERPL CALC-SCNC: 7 MMOL/L — SIGNIFICANT CHANGE UP (ref 7–14)
BASOPHILS # BLD AUTO: 0.02 K/UL — SIGNIFICANT CHANGE UP (ref 0–0.2)
BASOPHILS NFR BLD AUTO: 0.4 % — SIGNIFICANT CHANGE UP (ref 0–1)
BUN SERPL-MCNC: 11 MG/DL — SIGNIFICANT CHANGE UP (ref 10–20)
CALCIUM SERPL-MCNC: 9.3 MG/DL — SIGNIFICANT CHANGE UP (ref 8.4–10.4)
CHLORIDE SERPL-SCNC: 92 MMOL/L — LOW (ref 98–110)
CO2 SERPL-SCNC: 31 MMOL/L — SIGNIFICANT CHANGE UP (ref 17–32)
CREAT ?TM UR-MCNC: 26 MG/DL — SIGNIFICANT CHANGE UP
CREAT SERPL-MCNC: 0.7 MG/DL — SIGNIFICANT CHANGE UP (ref 0.7–1.5)
EGFR: 95 ML/MIN/1.73M2 — SIGNIFICANT CHANGE UP
EOSINOPHIL # BLD AUTO: 0.07 K/UL — SIGNIFICANT CHANGE UP (ref 0–0.7)
EOSINOPHIL NFR BLD AUTO: 1.5 % — SIGNIFICANT CHANGE UP (ref 0–8)
GLUCOSE SERPL-MCNC: 113 MG/DL — HIGH (ref 70–99)
HCT VFR BLD CALC: 31.7 % — LOW (ref 37–47)
HGB BLD-MCNC: 11.1 G/DL — LOW (ref 12–16)
IMM GRANULOCYTES NFR BLD AUTO: 0.4 % — HIGH (ref 0.1–0.3)
LYMPHOCYTES # BLD AUTO: 1.3 K/UL — SIGNIFICANT CHANGE UP (ref 1.2–3.4)
LYMPHOCYTES # BLD AUTO: 28 % — SIGNIFICANT CHANGE UP (ref 20.5–51.1)
MAGNESIUM SERPL-MCNC: 1.9 MG/DL — SIGNIFICANT CHANGE UP (ref 1.8–2.4)
MCHC RBC-ENTMCNC: 31.3 PG — HIGH (ref 27–31)
MCHC RBC-ENTMCNC: 35 G/DL — SIGNIFICANT CHANGE UP (ref 32–37)
MCV RBC AUTO: 89.3 FL — SIGNIFICANT CHANGE UP (ref 81–99)
MONOCYTES # BLD AUTO: 0.45 K/UL — SIGNIFICANT CHANGE UP (ref 0.1–0.6)
MONOCYTES NFR BLD AUTO: 9.7 % — HIGH (ref 1.7–9.3)
NEUTROPHILS # BLD AUTO: 2.79 K/UL — SIGNIFICANT CHANGE UP (ref 1.4–6.5)
NEUTROPHILS NFR BLD AUTO: 60 % — SIGNIFICANT CHANGE UP (ref 42.2–75.2)
NRBC # BLD: 0 /100 WBCS — SIGNIFICANT CHANGE UP (ref 0–0)
PHOSPHATE SERPL-MCNC: 2.9 MG/DL — SIGNIFICANT CHANGE UP (ref 2.1–4.9)
PLATELET # BLD AUTO: 190 K/UL — SIGNIFICANT CHANGE UP (ref 130–400)
POTASSIUM SERPL-MCNC: 3.7 MMOL/L — SIGNIFICANT CHANGE UP (ref 3.5–5)
POTASSIUM SERPL-SCNC: 3.7 MMOL/L — SIGNIFICANT CHANGE UP (ref 3.5–5)
RBC # BLD: 3.55 M/UL — LOW (ref 4.2–5.4)
RBC # FLD: 13.3 % — SIGNIFICANT CHANGE UP (ref 11.5–14.5)
SODIUM SERPL-SCNC: 130 MMOL/L — LOW (ref 135–146)
SODIUM UR-SCNC: 29 MMOL/L — SIGNIFICANT CHANGE UP
TSH SERPL-MCNC: 1.89 UIU/ML — SIGNIFICANT CHANGE UP (ref 0.27–4.2)
VIT B12 SERPL-MCNC: 331 PG/ML — SIGNIFICANT CHANGE UP (ref 232–1245)
WBC # BLD: 4.65 K/UL — LOW (ref 4.8–10.8)
WBC # FLD AUTO: 4.65 K/UL — LOW (ref 4.8–10.8)

## 2022-10-24 PROCEDURE — 99232 SBSQ HOSP IP/OBS MODERATE 35: CPT

## 2022-10-24 PROCEDURE — 99233 SBSQ HOSP IP/OBS HIGH 50: CPT

## 2022-10-24 PROCEDURE — 71045 X-RAY EXAM CHEST 1 VIEW: CPT | Mod: 26

## 2022-10-24 PROCEDURE — 93880 EXTRACRANIAL BILAT STUDY: CPT | Mod: 26

## 2022-10-24 PROCEDURE — 93306 TTE W/DOPPLER COMPLETE: CPT | Mod: 26

## 2022-10-24 RX ORDER — PANTOPRAZOLE SODIUM 20 MG/1
40 TABLET, DELAYED RELEASE ORAL
Refills: 0 | Status: DISCONTINUED | OUTPATIENT
Start: 2022-10-24 | End: 2022-10-24

## 2022-10-24 RX ORDER — ACETAMINOPHEN 500 MG
2 TABLET ORAL
Qty: 0 | Refills: 0 | DISCHARGE
Start: 2022-10-24

## 2022-10-24 RX ORDER — POTASSIUM CHLORIDE 20 MEQ
20 PACKET (EA) ORAL
Refills: 0 | Status: DISCONTINUED | OUTPATIENT
Start: 2022-10-24 | End: 2022-10-24

## 2022-10-24 RX ORDER — POTASSIUM CHLORIDE 20 MEQ
40 PACKET (EA) ORAL ONCE
Refills: 0 | Status: COMPLETED | OUTPATIENT
Start: 2022-10-24 | End: 2022-10-24

## 2022-10-24 RX ORDER — ENOXAPARIN SODIUM 100 MG/ML
40 INJECTION SUBCUTANEOUS EVERY 24 HOURS
Refills: 0 | Status: DISCONTINUED | OUTPATIENT
Start: 2022-10-24 | End: 2022-10-24

## 2022-10-24 RX ADMIN — PANTOPRAZOLE SODIUM 40 MILLIGRAM(S): 20 TABLET, DELAYED RELEASE ORAL at 09:26

## 2022-10-24 RX ADMIN — LISINOPRIL 20 MILLIGRAM(S): 2.5 TABLET ORAL at 05:09

## 2022-10-24 RX ADMIN — Medication 650 MILLIGRAM(S): at 04:19

## 2022-10-24 RX ADMIN — Medication 100 MILLIGRAM(S): at 11:33

## 2022-10-24 RX ADMIN — GABAPENTIN 300 MILLIGRAM(S): 400 CAPSULE ORAL at 05:09

## 2022-10-24 RX ADMIN — Medication 1 MILLIGRAM(S): at 11:33

## 2022-10-24 RX ADMIN — GABAPENTIN 300 MILLIGRAM(S): 400 CAPSULE ORAL at 15:10

## 2022-10-24 RX ADMIN — Medication 650 MILLIGRAM(S): at 17:37

## 2022-10-24 RX ADMIN — Medication 650 MILLIGRAM(S): at 11:33

## 2022-10-24 RX ADMIN — Medication 25 MILLIGRAM(S): at 05:09

## 2022-10-24 RX ADMIN — ENOXAPARIN SODIUM 40 MILLIGRAM(S): 100 INJECTION SUBCUTANEOUS at 11:33

## 2022-10-24 RX ADMIN — Medication 650 MILLIGRAM(S): at 12:03

## 2022-10-24 RX ADMIN — Medication 650 MILLIGRAM(S): at 18:07

## 2022-10-24 NOTE — DISCHARGE NOTE NURSING/CASE MANAGEMENT/SOCIAL WORK - PATIENT PORTAL LINK FT
You can access the FollowMyHealth Patient Portal offered by [facility] by registering at the following website: [email or web address]. By joining [employer]’s FollowMyHealth portal, you will also be able to view your health information using other applications (apps) compatible with our system.

## 2022-10-24 NOTE — DISCHARGE NOTE NURSING/CASE MANAGEMENT/SOCIAL WORK - NSDCPEFALRISK_GEN_ALL_CORE
For information on Fall & Injury Prevention, visit: [email or web address] OR  [email or web address] OR  https://www.cdc.gov/steadi/patient.html

## 2022-10-24 NOTE — DISCHARGE NOTE PROVIDER - HOSPITAL COURSE
66y year old female w/ PMHx of HTN seen as Trauma Alert s/p unwitnessed fall, +ht, ?loc, -ac, +etoh.  The patient was BIBA after her tenant found her on the floor of her living room at the base of the stairs. The patient is accompanied by two people who stated they were at a dinner party with her, saw her about 30 minutes before the fall. The patient admits to drinking some wine, does not remember the fall, denies any pain. The patient has a laceration to the back of her head, not actively bleeding. During examination of her head and neck, several pieces of glass were pulled from her matted hair. 3 cm posterior scalp laceration s/p staple repair     PAN SCAN as follows:  1. Left gluteal subcutaneous hematoma measuring up to 7.1 x 1.8 cm with   focus of contrast extravasation, suggesting active arterial bleed.  2. Left lateral 3-8th rib cortical irregularity, suggesting acute   nondisplaced fractures; no pneumothorax. Minimally displaced left   posterior 7th rib fracture.  3. Acute, minimally displaced lumbar L4 left transverse process fracture.  4. Age-indeterminate L1 vertebral body mild compression fracture   deformity; possibly chronic.    NSX consulted for above injury:  -No brace/abdominal binder in setting of rib fractures  -No neurosurgical intervention  -Patient may follow up outpatient with Dr. Johnson    Patient admitted to step-down unit for syncope workup, pain control, IS, CBC monitoring. Orthostatic vitals stable. H&H remains stable and no evidence of worsening gluteal hematoma on exam. Echo and Carotid duplex as below:    < from: TTE Echo Complete w/o Contrast w/ Doppler (10.24.22 @ 14:19) >  Summary:   1. Left ventricular ejection fraction, by visual estimation, is 60 to   65%.   2. Normal left ventricular size and wall thicknesses, with normal   systolic and diastolic function.   3. Calcified aortic valve without stenosis.   4. Trivial aortic regurgitation.  < end of copied text >    < from: VA Duplex Carotid, Bilat (10.24.22 @ 13:03) >  IMPRESSION: No significant hemodynamic stenosis of either carotid artery.   Mild bilateral carotid artery stenosis 20-39%  < end of copied text >    Patient worked with and cleared by PT. She is ambulating independently, freely voiding, vitally stable, and tolerating regular diet. She will be discharged home with trauma clinic follow up and neurosurgery follow up.

## 2022-10-24 NOTE — DISCHARGE NOTE PROVIDER - NSDCCPCAREPLAN_GEN_ALL_CORE_FT
PRINCIPAL DISCHARGE DIAGNOSIS  Diagnosis: Syncope  Assessment and Plan of Treatment:       SECONDARY DISCHARGE DIAGNOSES  Diagnosis: Laceration of head  Assessment and Plan of Treatment: Laceration repair with staples.  Follow up in the trauma clinic in 1 week for staple removal.  Call for an appointment.    Diagnosis: Rib fracture  Assessment and Plan of Treatment: Continue tylenol as needed for pain control.  Continue using the incentive spirometer when you are not ambulating.  Follow up in the trauma clinic after discharge.  Call for an appointment.    Diagnosis: Lumbar compression fracture  Assessment and Plan of Treatment: Continue tylenol as needed for pain control.  Follow up with neurosurgery after discharge.  Call for an appointment.

## 2022-10-24 NOTE — DISCHARGE NOTE PROVIDER - NSDCMRMEDTOKEN_GEN_ALL_CORE_FT
acetaminophen 325 mg oral tablet: 2 tab(s) orally every 6 hours, As Needed for pain  benazepril-hydrochlorothiazide 20 mg-25 mg oral tablet: 1 tab(s) orally once a day

## 2022-10-24 NOTE — PROGRESS NOTE ADULT - ASSESSMENT
ASSESSMENT:  66y Female  w/ PMHx of HTN seen as  Trauma Alert  s/p unwitnessed fall, +ht,?loc, -ac, +etoh with no complaints of pain , external signs of trauma include ?posterior scalp laceration- dried blood on face and matted hair d/t dried blood- no laceration found. Trauma assessment in ED: ABCs intact , GCS 15 , AAOx3,  PAUL.     Injuries identified:   -Left gluteal subcutaneous hematoma measuring up to 7.1 x 1.8 cm with   focus of contrast extravasation, suggesting active arterial bleed.  - Left lateral 3-8th rib cortical irregularity, suggesting acute   nondisplaced fractures; no pneumothorax. Minimally displaced left   posterior 7th rib fracture.  - Acute, minimally displaced lumbar L4 left transverse process fracture.  -Age-indeterminate L1 vertebral body mild compression fracture   deformity; possibly chronic.    PLAN:   - Incentive spirometer (1750ml)  - PT consult - patient declined working w/ PT  - Physiatry - Home w/ home services  - Pain control  - DVT ppx  - No acute intervention from neurosurgery  - Per medicine, f/u TSH, B12, Echo, Carotid duplex, and orthostatics    Trauma/ACS [id number]

## 2022-10-24 NOTE — DISCHARGE NOTE NURSING/CASE MANAGEMENT/SOCIAL WORK - NSDCVIVACCINE_GEN_ALL_CORE_FT
Tdap; 23-Oct-2022 02:53; Jabier Fuchs (RN); [employer]; [id number] (Exp. Date: 08-Dec-2024); IntraMuscular; Deltoid Left.; 0.5 milliLiter(s); VIS (VIS Published: 09-May-2013, VIS Presented: 23-Oct-2022);

## 2022-10-24 NOTE — DISCHARGE NOTE PROVIDER - CARE PROVIDER_API CALL
Marco Antonio Johnson; [facility])  [employer] Neurosurgery Surgery  [address] [place] [address]  Phone: [phone]  Fax: [phone]  Follow Up Time:

## 2022-10-24 NOTE — DISCHARGE NOTE PROVIDER - NSFOLLOWUPCLINICS_GEN_ALL_ED_FT
[facility]  Trauma Surgery  [address] [place] [address]  Phone: [phone]  Fax:

## 2022-10-24 NOTE — PHYSICAL THERAPY INITIAL EVALUATION ADULT - PERTINENT HX OF CURRENT PROBLEM, REHAB EVAL
66yF w/ PMHx of HTN seen as Trauma Alert  s/p unwitnessed fall, +ht, ?loc, -ac, +etoh.  The patient was BIBA after her tenant found her on the floor of her living room at the base of the stairs. The patient is accompanied by two people who stated they were at a dinner party with her, saw her about 30 minutes before the fall. The patient admits to drinking some wine, does not remember the fall, denies any pain. The patient has a laceration to the back of her head, not actively bleeding. During examination of her head and neck, several pieces of glass were pulled from her matted hair.

## 2022-10-24 NOTE — PHYSICAL THERAPY INITIAL EVALUATION ADULT - ADDITIONAL COMMENTS
Patient lives alone in house with 4 steps to enter and 12 steps to bedroom. Was independent in ADL's and ambulation no AD

## 2022-10-24 NOTE — PROGRESS NOTE ADULT - ATTENDING COMMENTS
Trauma/ACS Attending  Note Attestation    Patient is examined and evaluated at the bedside with the residents/PAs. Treatment plan discussed with the team, nurses, and consulting physicians and consulting teams. Medications, radiological studies and all other relevant studies reviewed.     BRITT WILLIAM Patient is a 66y old  Female who presents with a chief complaint of fall multiple ribs fractures, gluteal hematoma      Vital Signs Last 24 Hrs  T(C): 36.9 (24 Oct 2022 12:00), Max: 37 (24 Oct 2022 04:00)  T(F): 98.5 (24 Oct 2022 12:00), Max: 98.6 (24 Oct 2022 04:00)  HR: 79 (24 Oct 2022 16:00) (75 - 89)  BP: 135/72 (24 Oct 2022 15:35) (106/59 - 142/67)  BP(mean): 95 (24 Oct 2022 15:35) (76 - 97)  RR: 23 (24 Oct 2022 16:00) (14 - 26)  SpO2: 100% (24 Oct 2022 16:00) (97% - 100%)    Parameters below as of 24 Oct 2022 16:00  Patient On (Oxygen Delivery Method): room air                            11.1   4.65  )-----------( 190      ( 24 Oct 2022 02:05 )             31.7     10-24    130<L>  |  92<L>  |  11  ----------------------------<  113<H>  3.7   |  31  |  0.7    Ca    9.3      24 Oct 2022 02:05  Phos  2.9     10-24  Mg     1.9     10-24    TPro  7.0  /  Alb  4.5  /  TBili  0.4  /  DBili  x   /  AST  47<H>  /  ALT  37  /  AlkPhos  73  10-23      Diagnosis: S/P fall with multiple injuries    Plan:	  - supportive care  - pain management  - incentive spirometer   - DVT prophylaxis       [x ] SCDs [x ] Lovenox [ ] Heparins SQ  [ ] None  - GI prophylaxis  - follow up consults  - repeat studies as needed  - PT evaluation and follow up for dischargw planning  - replace electrolytes  - case management evaluation     June Ibrahim MD, FACS  Trauma/ACS/Surgical Critical Care Attending

## 2022-10-24 NOTE — PROGRESS NOTE ADULT - ASSESSMENT
66yF w/ PMHx of HTN seen as Trauma Alert  s/p unwitnessed fall, +ht, ?loc, -ac, +etoh.  The patient was BIBA after her tenant found her on the floor of her living room at the base of the stairs    # Syncope 2/2 ETOH intoxication rule out arrhythmogenic vs. seizure etiology  # Fall associated with trauma  # Left posterior scalp laceration  # Left rib fractures non-displaced 3-8th, 7th displaced  # Compression fracture and transverse process fracture of lumbar vertebrae  # Left gluteal hematoma  - pain control  - trauma team management  - no prior cardiac history or seizure reported, EKG reviewed no ischemic changes  - given significant murmur on exam and left carotid bruit, may obtain vascular duplex of bilateral carotid and 2D echo to evaluate for carotid stenosis or significant valvular disease   - obtain TSH and vitamin B12 level  - EEG   - echo   - check orthostats    # ETOH intoxication although pt denies drinking except a glass of wine   - consider CIWA protocol  - consider adding thiamine and folate given ETOH use    #murmur on exam- echo    # HTN, stable  - c/w home BP meds    #Hyponatremia- 130- stable for now monitor- s/p IVF- can send urine lytes     medicine to follow

## 2022-10-24 NOTE — PHYSICAL THERAPY INITIAL EVALUATION ADULT - PLANNED THERAPY INTERVENTIONS, PT EVAL
No PT intervention needed. Patient independent in rtansfers and ambulation no AD. Contact PT if status changes.

## 2022-10-26 PROBLEM — Z00.00 ENCOUNTER FOR PREVENTIVE HEALTH EXAMINATION: Status: ACTIVE | Noted: 2022-10-26

## 2022-10-28 ENCOUNTER — APPOINTMENT (OUTPATIENT)
Dept: SURGERY | Facility: CLINIC | Age: 67
End: 2022-10-28

## 2022-10-28 VITALS
SYSTOLIC BLOOD PRESSURE: 122 MMHG | DIASTOLIC BLOOD PRESSURE: 78 MMHG | WEIGHT: 118 LBS | OXYGEN SATURATION: 95 % | TEMPERATURE: 97 F | HEIGHT: 63 IN | HEART RATE: 76 BPM | BODY MASS INDEX: 20.91 KG/M2

## 2022-10-28 DIAGNOSIS — Z86.79 PERSONAL HISTORY OF OTHER DISEASES OF THE CIRCULATORY SYSTEM: ICD-10-CM

## 2022-10-28 PROCEDURE — 99212 OFFICE O/P EST SF 10 MIN: CPT

## 2022-10-30 DIAGNOSIS — E87.1 HYPO-OSMOLALITY AND HYPONATREMIA: ICD-10-CM
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
## 2022-10-30 DIAGNOSIS — I10 ESSENTIAL (PRIMARY) HYPERTENSION: ICD-10-CM
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
## 2022-10-30 DIAGNOSIS — M96.A3 MULTIPLE FRACTURES OF RIBS ASSOCIATED WITH CHEST COMPRESSION AND CARDIOPULMONARY RESUSCITATION: ICD-10-CM

## 2022-10-30 DIAGNOSIS — F10.129 ALCOHOL ABUSE WITH INTOXICATION, UNSPECIFIED: ICD-10-CM

## 2022-10-30 DIAGNOSIS — S01.01XA LACERATION WITHOUT FOREIGN BODY OF SCALP, INITIAL ENCOUNTER: ICD-10-CM
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
## 2022-10-30 DIAGNOSIS — S30.0XXA CONTUSION OF LOWER BACK AND PELVIS, INITIAL ENCOUNTER: ICD-10-CM

## 2022-10-30 DIAGNOSIS — S32.049A UNSPECIFIED FRACTURE OF FOURTH LUMBAR VERTEBRA, INITIAL ENCOUNTER FOR CLOSED FRACTURE: ICD-10-CM

## 2022-10-30 DIAGNOSIS — R55 SYNCOPE AND COLLAPSE: ICD-10-CM

## 2022-11-07 PROBLEM — I10 ESSENTIAL (PRIMARY) HYPERTENSION: Chronic | Status: ACTIVE | Noted: 2022-11-04

## 2022-12-08 ENCOUNTER — APPOINTMENT (OUTPATIENT)
Dept: NEUROLOGY | Facility: CLINIC | Age: 67
End: 2022-12-08

## 2022-12-08 VITALS
HEIGHT: 63 IN | WEIGHT: 118 LBS | SYSTOLIC BLOOD PRESSURE: 152 MMHG | DIASTOLIC BLOOD PRESSURE: 86 MMHG | BODY MASS INDEX: 20.91 KG/M2 | HEART RATE: 76 BPM

## 2022-12-08 PROCEDURE — 99204 OFFICE O/P NEW MOD 45 MIN: CPT

## 2022-12-08 NOTE — ASSESSMENT
[FreeTextEntry1] : syncope-etiology unclear. since she had a cardiac workup already, I will send her for mri of the brain and eeg.  I will be able to clear her back to work if tests come back normal.\par \par I, Chapito Elliott, attest that this documentation has been prepared under the direction and in the presence of Provider Etsrada Zuñiga DO\par \par \par Thank you for allowing me to assist in the management of this patient.\par \par \par Estrada Zuñiga DO\leilani Board Certified, Neurology\par

## 2022-12-08 NOTE — HISTORY OF PRESENT ILLNESS
[FreeTextEntry1] : Ms. Garcia presents today as a 67 year old woman for neurological consult. She presents after an episode where she fall down the stairs. She has no memory of this fall. She denies dizziness prior to the fall. She was at the top of her stairs going down and fell down 14 stairs. 30mins prior to the fall she was drinking wine but states it was not a lot. Her tenant heard the fall. She does not remember if she loss consciousness or not but was informed that she was answering questions in the ambulance. She has not had any additional workup s/p this episode. Blood clots were ruled out. At [facility] she underwent syncope workup, EKG and Echocardiogram which were both normal. Since this episode she denies dizziness and gait instability. She does note headaches when working from home.

## 2022-12-20 NOTE — PATIENT PROFILE ADULT - CAREGIVER RELATION TO PATIENT
Daughter  FAMILY HISTORY:  Father  Still living? No  FH: renal failure, Age at diagnosis: Age Unknown    Mother  Still living? No  FH: kidney cancer, Age at diagnosis: Age Unknown  FH: lung cancer, Age at diagnosis: Age Unknown

## 2022-12-22 ENCOUNTER — APPOINTMENT (OUTPATIENT)
Dept: NEUROLOGY | Facility: CLINIC | Age: 67
End: 2022-12-22

## 2023-01-10 ENCOUNTER — APPOINTMENT (OUTPATIENT)
Dept: NEUROLOGY | Facility: CLINIC | Age: 68
End: 2023-01-10
Payer: COMMERCIAL

## 2023-01-10 PROCEDURE — 95816 EEG AWAKE AND DROWSY: CPT

## 2023-01-20 ENCOUNTER — APPOINTMENT (OUTPATIENT)
Dept: NEUROLOGY | Facility: CLINIC | Age: 68
End: 2023-01-20
Payer: COMMERCIAL

## 2023-01-20 VITALS
DIASTOLIC BLOOD PRESSURE: 83 MMHG | SYSTOLIC BLOOD PRESSURE: 141 MMHG | HEART RATE: 73 BPM | WEIGHT: 118 LBS | BODY MASS INDEX: 20.91 KG/M2 | HEIGHT: 63 IN

## 2023-01-20 DIAGNOSIS — R55 SYNCOPE AND COLLAPSE: ICD-10-CM

## 2023-01-20 PROCEDURE — 99213 OFFICE O/P EST LOW 20 MIN: CPT

## 2023-01-24 PROBLEM — R55 SYNCOPE AND COLLAPSE: Status: ACTIVE | Noted: 2022-12-08

## 2023-01-24 NOTE — HISTORY OF PRESENT ILLNESS
[FreeTextEntry1] : Ms. Garcia is a 67 year old woman who returns today for a follow up. Her headaches are a lot better. Her MRI was normal and showed no findings. She states she would like to report back to work full duty. She works as a . \par \par \par \par \par Ms. Garcia presents today as a 67 year old woman for neurological consult. She presents after an episode where she fall down the stairs. She has no memory of this fall. She denies dizziness prior to the fall. She was at the top of her stairs going down and fell down 14 stairs. 30mins prior to the fall she was drinking wine but states it was not a lot. Her tenant heard the fall. She does not remember if she loss consciousness or not but was informed that she was answering questions in the ambulance. She has not had any additional workup s/p this episode. Blood clots were ruled out. At [facility] she underwent syncope workup, EKG and Echocardiogram which were both normal. Since this episode she denies dizziness and gait instability. She does note headaches when working from home.

## 2023-01-24 NOTE — HISTORY OF PRESENT ILLNESS
[FreeTextEntry1] : Ms. Garcia is a 67 year old woman who returns today for a follow up. Her headaches are a lot better. Her MRI was normal and showed no findings. She states she would like to report back to work full duty. She works as a . \par \par \par \par \par Ms. Garcia presents today as a 67 year old woman for neurological consult. She presents after an episode where she fall down the stairs. She has no memory of this fall. She denies dizziness prior to the fall. She was at the top of her stairs going down and fell down 14 stairs. 30mins prior to the fall she was drinking wine but states it was not a lot. Her tenant heard the fall. She does not remember if she loss consciousness or not but was informed that she was answering questions in the ambulance. She has not had any additional workup s/p this episode. Blood clots were ruled out. At [facility] she underwent syncope workup, EKG and Echocardiogram which were both normal. Since this episode she denies dizziness and gait instability. She does note headaches when working from home.

## 2023-01-24 NOTE — ASSESSMENT
[FreeTextEntry1] : syncope- workup negative\par -virtually asymptomatic at this time. normal neurological exam. can be cleared to go back to work with no restrictions.\par \par \par I, Rani Carmona, Attest that this documentation has been prepared under the direction and in the presence of Provider Estrada Zuñiga DO\leilani \par Thank You for letting me assist in the management of this patient. \par \par Estrada Zuñiga DO\leilani Board Certified, Neurology\par

## 2023-01-31 ENCOUNTER — APPOINTMENT (OUTPATIENT)
Dept: NEUROLOGY | Facility: CLINIC | Age: 68
End: 2023-01-31

## 2023-03-18 ENCOUNTER — EMERGENCY (EMERGENCY)
Facility: HOSPITAL | Age: 68
LOS: 0 days | Discharge: HOME | End: 2023-03-18
Attending: EMERGENCY MEDICINE
Payer: MEDICARE

## 2023-03-18 VITALS
RESPIRATION RATE: 18 BRPM | OXYGEN SATURATION: 100 % | DIASTOLIC BLOOD PRESSURE: 74 MMHG | TEMPERATURE: 97 F | SYSTOLIC BLOOD PRESSURE: 155 MMHG | HEART RATE: 92 BPM

## 2023-03-18 VITALS
OXYGEN SATURATION: 98 % | RESPIRATION RATE: 18 BRPM | SYSTOLIC BLOOD PRESSURE: 148 MMHG | HEART RATE: 88 BPM | DIASTOLIC BLOOD PRESSURE: 72 MMHG

## 2023-03-18 DIAGNOSIS — M25.551 PAIN IN RIGHT HIP: ICD-10-CM
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
## 2023-03-18 DIAGNOSIS — M79.604 PAIN IN RIGHT LEG: ICD-10-CM

## 2023-03-18 DIAGNOSIS — M25.512 PAIN IN LEFT SHOULDER: ICD-10-CM

## 2023-03-18 DIAGNOSIS — I10 ESSENTIAL (PRIMARY) HYPERTENSION: ICD-10-CM

## 2023-03-18 DIAGNOSIS — M54.50 LOW BACK PAIN, UNSPECIFIED: ICD-10-CM

## 2023-03-18 DIAGNOSIS — M25.571 PAIN IN RIGHT ANKLE AND JOINTS OF RIGHT FOOT: ICD-10-CM

## 2023-03-18 DIAGNOSIS — S80.921A UNSPECIFIED SUPERFICIAL INJURY OF RIGHT LOWER LEG, INITIAL ENCOUNTER: ICD-10-CM
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
## 2023-03-18 PROCEDURE — 71045 X-RAY EXAM CHEST 1 VIEW: CPT | Mod: 26

## 2023-03-18 PROCEDURE — 73030 X-RAY EXAM OF SHOULDER: CPT | Mod: LT

## 2023-03-18 PROCEDURE — 99284 EMERGENCY DEPT VISIT MOD MDM: CPT

## 2023-03-18 PROCEDURE — 73502 X-RAY EXAM HIP UNI 2-3 VIEWS: CPT | Mod: 26,RT

## 2023-03-18 PROCEDURE — 73030 X-RAY EXAM OF SHOULDER: CPT | Mod: 26,LT

## 2023-03-18 PROCEDURE — 73610 X-RAY EXAM OF ANKLE: CPT | Mod: 26,RT

## 2023-03-18 PROCEDURE — 73610 X-RAY EXAM OF ANKLE: CPT | Mod: RT

## 2023-03-18 PROCEDURE — 99284 EMERGENCY DEPT VISIT MOD MDM: CPT | Mod: 25

## 2023-03-18 PROCEDURE — 73502 X-RAY EXAM HIP UNI 2-3 VIEWS: CPT | Mod: RT

## 2023-03-18 PROCEDURE — 71045 X-RAY EXAM CHEST 1 VIEW: CPT

## 2023-03-18 PROCEDURE — 96372 THER/PROPH/DIAG INJ SC/IM: CPT

## 2023-03-18 RX ORDER — KETOROLAC TROMETHAMINE 30 MG/ML
15 SYRINGE (ML) INJECTION ONCE
Refills: 0 | Status: DISCONTINUED | OUTPATIENT
Start: 2023-03-18 | End: 2023-03-18

## 2023-03-18 RX ADMIN — Medication 15 MILLIGRAM(S): at 12:12

## 2023-03-18 NOTE — ED ADULT NURSE NOTE - NSIMPLEMENTINTERV_GEN_ALL_ED
Implemented All Fall with Harm Risk Interventions:  [place] to call system. Call bell, personal items and telephone within reach. Instruct patient to call for assistance. Room bathroom lighting operational. Non-slip footwear when patient is off stretcher. Physically safe environment: no spills, clutter or unnecessary equipment. Stretcher in lowest position, wheels locked, appropriate side rails in place. Provide visual cue, wrist band, yellow gown, etc. Monitor gait and stability. Monitor for mental status changes and reorient to person, place, and time. Review medications for side effects contributing to fall risk. Reinforce activity limits and safety measures with patient and family. Provide visual clues: red socks.

## 2023-03-18 NOTE — ED PROVIDER NOTE - CARE PROVIDER_API CALL
Leeroy Jenkins (MD)  Orthopaedic Surgery  [address] [place] [address]  Phone: [phone]  Fax: [phone]  Follow Up Time: 7-10 Days

## 2023-03-18 NOTE — ED PROVIDER NOTE - NS ED ATTENDING STATEMENT MOD
This was a shared visit with the SRINI. I reviewed and verified the documentation and independently performed the documented:

## 2023-03-18 NOTE — ED ADULT TRIAGE NOTE - CHIEF COMPLAINT QUOTE
BIBA from home "On Thursday night I missed stepped and fell down three (wooden) steps forwards. I got up, but since then my right leg has been hurting me and left shoulder to the clavical." Denies LOC, denies head injury, denies anticoagulant.

## 2023-03-18 NOTE — ED PROVIDER NOTE - ATTENDING APP SHARED VISIT CONTRIBUTION OF CARE
Patient is a 67-year-old female comes in for right lower extremity pain after a fall 3 days ago and having pain along the right buttock and hip and ankle.  No swelling or bruising noted.    Exam: Stable pelvis, good range of motion without joint effusion or tenderness, 2+ DP pulse  Plan: X-ray, crutches, Ortho follow

## 2023-03-18 NOTE — ED PROVIDER NOTE - OBJECTIVE STATEMENT
66 yo female, pmh of htn, presents to ed for fall, c/o right leg pain and left shoulder pain, occurred two days ago. Denies fever, chills, cp, sob, neck pain, visual changes, nvd, dizziness, numbness, tingling.

## 2023-03-18 NOTE — ED PROVIDER NOTE - PATIENT PORTAL LINK FT
You can access the FollowMyHealth Patient Portal offered by [facility] by registering at the following website: [email or web address]. By joining [employer]’s FollowMyHealth portal, you will also be able to view your health information using other applications (apps) compatible with our system.

## 2023-03-18 NOTE — ED PROVIDER NOTE - NSFOLLOWUPINSTRUCTIONS_ED_ALL_ED_FT
Our Emergency Department Referral Coordinators will be reaching out ot you in the next 24-48 hours from 9:00am to 5:00pm (Monday to Friday) with a follow up appointment. Please expect a phone call from the hospital in that time frame. If you do not receive a call or if you have any questions or concerns, you can reach them at [phone] or [phone].         Fracture    A fracture is a break in one of your bones. This can occur from a variety of injuries, especially traumatic ones. Symptoms include pain, bruising, or swelling. Do not use the injured limb. If a fracture is in one of the bones below your waist, do not put weight on that limb unless instructed to do so by your healthcare provider. Crutches or a cane may have been provided. A splint or cast may have been applied by your health care provider. Make sure to keep it dry and follow up with an orthopedist as instructed.    SEEK IMMEDIATE MEDICAL CARE IF YOU HAVE ANY OF THE FOLLOWING SYMPTOMS: numbness, tingling, increasing pain, or weakness in any part of the injured limb. Our Emergency Department Referral Coordinators will be reaching out ot you in the next 24-48 hours from 9:00am to 5:00pm (Monday to Friday) with a follow up appointment. Please expect a phone call from the hospital in that time frame. If you do not receive a call or if you have any questions or concerns, you can reach them at [phone] or [phone].     Sprain    A sprain is a stretch or tear in one of the tough, fiber-like tissues (ligaments) in your body. This is caused by an injury to the area such as a twisting mechanism. Symptoms include pain, swelling, or bruising. Rest that area over the next several days and slowly resume activity when tolerated. Ice can help with swelling and pain.     SEEK IMMEDIATE MEDICAL CARE IF YOU HAVE ANY OF THE FOLLOWING SYMPTOMS: worsening pain, inability to move that body part, numbness or tingling.

## 2023-03-18 NOTE — ED PROVIDER NOTE - PHYSICAL EXAMINATION
Physical Exam    Vital Signs: I have reviewed the initial vital signs.  Constitutional: appears stated age, no acute distress  Eyes: Conjunctiva pink, Sclera clear  Cardiovascular: S1 and S2, regular rate, regular rhythm, well-perfused extremities, radial pulses equal and 2+, pedal pulses 2+ and equal  Respiratory: unlabored respiratory effort, clear to auscultation bilaterally no wheezing, rales and rhonchi  Gastrointestinal: soft, non-tender abdomen, no pulsatile mass, normal bowl sounds  Musculoskeletal: supple neck, no lower extremity edema, no midline tenderness, ttp to left clavicle, from of all other joints.   Integumentary: warm, dry, no rash  Neurologic: awake, alert, nvi

## 2023-03-20 ENCOUNTER — APPOINTMENT (OUTPATIENT)
Dept: ORTHOPEDIC SURGERY | Facility: CLINIC | Age: 68
End: 2023-03-20
Payer: MEDICARE

## 2023-03-20 ENCOUNTER — NON-APPOINTMENT (OUTPATIENT)
Age: 68
End: 2023-03-20

## 2023-03-20 VITALS — BODY MASS INDEX: 20.91 KG/M2 | HEIGHT: 63 IN | WEIGHT: 118 LBS

## 2023-03-20 DIAGNOSIS — S70.01XA CONTUSION OF RIGHT HIP, INITIAL ENCOUNTER: ICD-10-CM

## 2023-03-20 DIAGNOSIS — S93.491A SPRAIN OF OTHER LIGAMENT OF RIGHT ANKLE, INITIAL ENCOUNTER: ICD-10-CM

## 2023-03-20 PROCEDURE — 99203 OFFICE O/P NEW LOW 30 MIN: CPT

## 2023-03-20 NOTE — HISTORY OF PRESENT ILLNESS
[de-identified] :  patient is a 67-year-old female here for evaluation of right ankle /hip pain.  Patient reports on 03/16/2023 she misstepped causing her to invert her right ankle and landed directly on her right hip.  Patient reports immediate swelling and pain in the ankle and aching pain to the hip following the injury.  She was brought to the ER on 03/18/2023 where x-rays were taken which confirmed no acute fractures, subluxations, or dislocations of the ankle and hip.  Patient does report a history of hip replacement to her right hip in the past.  She has been taking Advil/ ibuprofen over-the-counter see if the pain with very minimal relief.  She is unable to bear weight at this time and has been ambulating with a cane.

## 2023-03-20 NOTE — IMAGING
[de-identified] :   Physical examination of the right hip: No swelling, ecchymosis, or erythema appreciated.  Skin is intact.  Patient   Mildly tender to palpation at the lateral aspect of the right hip. patient has full range of motion of the hip upon flexion, extension, internal/external rotation without any limitations.  Good strength throughout.  Sensory motor intact distally.  Patient denies any popping /clicking.\par \par  physical examinatio the right  Ankle:  Mild swelling laterally greater than medially.  No ecchymosis or erythema appreciated.  Skin is intact.  Patient is mildly tender palpation along the ATFL and the CFL.  No tenderness over the lateral or medial malleolus.  Noticed with Achilles tendon.  No tenderness over the deltoid ligament.  Nontender over the foot or phalanges.  Patient has full range of motion of the ankle upon flexion and extension but experiences pain when performing these movements.  DP pulse are palpable.  Sensory motor intact distally.  Good strength throughout.  Patient unable to ambulate at this time is bearing weight causes her pain.

## 2023-03-20 NOTE — DATA REVIEWED
[FreeTextEntry1] :   X-rays of the right hip taken at the ER were reviewed in the office today: No acute fracture, subluxation, or dislocations.  Hardware from previous surgery well aligned.  No lucencies appreciated.\par \par   X-rays of the right ankle taken at the ER reviewed in the office today: No acute fracture, subluxation or dislocations.

## 2023-03-20 NOTE — DISCUSSION/SUMMARY
[de-identified] : Treatment plan is discussed:\par \par   My clinical suspicion is high for spine of the right ankle along with a contusion of the right hip given the patient's history, physical examination findings, x-ray findings.\par \par I recommended anti-inflammatory medication. Patient agrees to taking Advil/Ibuprofen OTC as needed for pain. Benefits discussed. Confirmed no contraindication to NSAIDs.\par \par I recommended patient rest, ice, compress, and elevate the ankle regularly. Encouraged activity modification as tolerable. Encouraged gentle range of motion to avoid stiffness. no gym /sports until follow-up evaluation.\par \par The patient was placed in a right tall Cam walker boot.  Patient instructed to wear the boot at all times past when active and ambulating.  Patient may remove the boot when showering/sleeping.\par \par Patient understands that the first 2 weeks are the worst in regard to pain and swelling. Patient understands that residual pain and swelling may last for up to 6 months-1 year.\par \par All questions and concerns addressed to patient's satisfaction. Patient expresses full understanding of treatment plan.\par Patient will follow up in 3-4 weeks with for further evaluation treatment.\par The patient was seen under supervision of Dr. Camarena.\par

## 2023-03-23 RX ORDER — TRAMADOL HYDROCHLORIDE 50 MG/1
50 TABLET, COATED ORAL 3 TIMES DAILY
Qty: 12 | Refills: 0 | Status: ACTIVE | COMMUNITY
Start: 2023-03-23 | End: 1900-01-01

## 2023-03-27 ENCOUNTER — APPOINTMENT (OUTPATIENT)
Dept: ORTHOPEDIC SURGERY | Facility: CLINIC | Age: 68
End: 2023-03-27
Payer: MEDICARE

## 2023-03-27 ENCOUNTER — NON-APPOINTMENT (OUTPATIENT)
Age: 68
End: 2023-03-27

## 2023-03-27 ENCOUNTER — APPOINTMENT (OUTPATIENT)
Dept: PAIN MANAGEMENT | Facility: CLINIC | Age: 68
End: 2023-03-27
Payer: MEDICARE

## 2023-03-27 VITALS — HEIGHT: 63 IN | WEIGHT: 118 LBS | BODY MASS INDEX: 20.91 KG/M2

## 2023-03-27 PROCEDURE — 99204 OFFICE O/P NEW MOD 45 MIN: CPT

## 2023-03-27 PROCEDURE — 72110 X-RAY EXAM L-2 SPINE 4/>VWS: CPT

## 2023-03-27 PROCEDURE — 99213 OFFICE O/P EST LOW 20 MIN: CPT

## 2023-03-27 NOTE — DATA REVIEWED
[FreeTextEntry1] : Obtained and reviewed AP lateral flexion-extension lumbar spine x-rays.  The patient is significant degenerative disc disease and a degenerative scoliosis.  The patient also has spondylolisthesis at L5-S1 L4-5.  She also has an upper lumbar spine compression deformity consistent with a fall that she states that she had in the fall.

## 2023-03-27 NOTE — PHYSICAL EXAM
[de-identified] : TTP midline spine and paraspinal musculature \par Strength                                         \par Hip flexor\par   Right: 5/5; Left: 5/5                             \par Knee extensor  \par   Right: 5/5; Left: 5/5                     \par Ankle dorsiflexion\par   Right: 5/5; Left: 5/5                  \par EHL        \par   Right: 5/5; Left: 5/5                                \par Ankle plantarflexion    \par   Right: 5/5; Left: 5/5\par \par Sensation\par L1\par   Right: 2/2; Left: 2/2\par L2\par   Right: 2/2; Left: 2/2\par L3\par   Right: 2/2; Left: 2/2\par L4\par   Right: 2/2; Left: 2/2\par L5\par   Right: 2/2; Left: 2/2\par S1\par   Right: 2/2; Left: 2/2\par \par Reflexes\par Patella\par   Right: 2+; Left 2+\par Achilles\par   Right: 2+; Left 2+\par Clonus\par  Right: absent; L: absent\par

## 2023-03-27 NOTE — HISTORY OF PRESENT ILLNESS
[de-identified] : 67-year-old female presents to me with severe low back pain radiating down her right leg pain the pain is so severe that the patient cannot walk and she is in a wheelchair.  She has had radicular pain in her leg before for few years now but never to this severity.  She has never been will care for it.  She also has numbness or tingling.  Denies loss of bladder or bowel.  She has been taking anti-inflammatories for this pain and is not providing her any relief.

## 2023-03-27 NOTE — DISCUSSION/SUMMARY
[de-identified] : 67-year-old female with inability to ambulate due to her radicular pain.  Given her symptoms I am sending the patient over to interventional pain management for them to discuss injections as a potential option as well as any oral medications that they see fit.  I do not recommend surgery for the patient at this time.

## 2023-04-03 ENCOUNTER — APPOINTMENT (OUTPATIENT)
Dept: PAIN MANAGEMENT | Facility: CLINIC | Age: 68
End: 2023-04-03
Payer: MEDICARE

## 2023-04-03 PROCEDURE — 64483 NJX AA&/STRD TFRM EPI L/S 1: CPT | Mod: RT

## 2023-04-03 PROCEDURE — 72100 X-RAY EXAM L-S SPINE 2/3 VWS: CPT

## 2023-04-03 PROCEDURE — 93040 RHYTHM ECG WITH REPORT: CPT | Mod: 59

## 2023-04-03 PROCEDURE — 64484 NJX AA&/STRD TFRM EPI L/S EA: CPT | Mod: RT

## 2023-04-03 PROCEDURE — 00630 ANES PX LUMBAR REGION NOS: CPT | Mod: QZ,P2

## 2023-04-03 NOTE — PHYSICAL EXAM
[de-identified] : BACK - tenderness into the lumbar paraspinals. ROM restricted. Pain with flexion. Positive SLR on the right.\par \par Antalgic gait: using a cane/wheelchair.

## 2023-04-03 NOTE — DATA REVIEWED
[FreeTextEntry1] : AP lateral flexion-extension lumbar spine x-rays. The patient is significant degenerative disc disease and a degenerative scoliosis. The patient also has spondylolisthesis at L5-S1 L4-5. She also has an upper lumbar spine compression deformity consistent with a fall that she states that she had in the fall. \par

## 2023-04-03 NOTE — ASSESSMENT
[FreeTextEntry1] : 67-year-old female presenting with severe lumbar radicular pain. Patient is presenting with radicular pain with impairment in ADLs and functionality.  The pain has not responded to conservative care, including medications, stretching, as well as active modalities, such as physical therapy.  Imaging studies as well as physical exam findings corroborate the symptomatology and radicular pain.  We will proceed with an epidural at this point.   I will proceed with a right L4-5, L5-S1 transforaminal epidural steroid injection with sedation.  I will also like to obtain an MRI of the lumbar spine as well as EMG of the lower extremities.  She will follow-up in 4 weeks for reassessment.\par \par MRI of the lumbar spine was ordered to delineate spine pathology such as disc displacement and stenosis.\par \par An Lower EMG/NCV was ordered to delineate radiculopathy vs neuropathies vs nerve damage.\par \par Right L4-5, L5-S1 transforaminal epidural steroid injection with sedation.\par \par Patient had a MRI that shows a radicular component along with pain referred into the lower extremity. Patient has trialed rehab (Home exercise, physical therapy or chiropractic care) and medications I will schedule a L4-5 SNRI. \par \par Risk, benefits, pros and cons of procedure were explained to the patient using models and diagrams and their questions were answered. \par \par \par The patient has severe anxiety of procedures that necessitates monitored anesthesia care (MAC). The procedure performed will be close to major nerves, arteries, and spinal cord and/or joint structures. Due to the proximity of these structures, we need the patient to be still during the procedure.  With the help of MAC, this will be safely achieved and decrease the risk of any complications.\par  \par \par Entered by Fela Adams, acting as scribe for Dr. Reid.\par  \par The documentation recorded by the scribe, in my presence, accurately reflects the service I personally performed, and the decisions made by me with my edits as appropriate.\par  \par Best Regards, \par Poncho Reid MD \par Board Certified, Anesthesiology \par Board Certified, Pain Medicine\par

## 2023-04-03 NOTE — PHYSICAL EXAM
[de-identified] : BACK - tenderness into the lumbar paraspinals. ROM restricted. Pain with flexion. Positive SLR on the right.\par \par Antalgic gait: using a cane/wheelchair.

## 2023-04-03 NOTE — HISTORY OF PRESENT ILLNESS
[FreeTextEntry1] : Ms. Garcia is a 67 year old female presenting to our walk in clinic to establish care for her right sided lower back/leg pain. She states last week she slipped and missed one step. Since then, she has noticed a severe flare up of right sided leg pain.\par \par It is to be noted that she does have a history of pins and screws in her right leg.\par \par She has ongoing pain in her right side of her lower back and into the right lower extremity. She notes numbness/tingling into the right foot. She states there are tingling all the way into the toes. She states she cannot put pressure over the right leg. She is using a cane/wheelchair secondary to the pain. She states the pain is severe and keeps her up at night. She rates the pain at a 8/10 on the pain scale. She states the pain is constant in nature.\par \par She denies any changes in bowel/bladder habits or any saddle paresthesia.

## 2023-04-03 NOTE — PHYSICAL EXAM
[de-identified] : BACK - tenderness into the lumbar paraspinals. ROM restricted. Pain with flexion. Positive SLR on the right.\par \par Antalgic gait: using a cane/wheelchair.

## 2023-04-04 NOTE — PROCEDURE
[FreeTextEntry3] : SELECTIVE TRANSFORAMINAL LUMBAR L4-5 and L5-S1 EPIDURAL NERVE ROOT INJECTION \par \par Date:  2023\par \par Patient: Ioana Garcia\par \par :  1955\par \par \par \par \par \par Preoperative Diagnosis: Lumbar Radiculopathy \par \par \par \par \par \par Procedure: \par 1. Selective Right L4-5 and L5-S1 Transforaminal Lumbar Epidural Nerve Root Injection under Fluoroscopy\par 2. Epidurography\par 3. Fluoroscopic guidance and localization of needle \par \par \par \par \par \par Physician: Poncho Reid M.D.\par Anesthesiologist/CRNA:   Dami\par Anesthesia: MAC Versed 2mg/Fentanyl 200 mcg IVP\par Medical Necessity:  Failure of conservative management.\par Consent:  Though unusual, the possible complications including infection, bleeding, nerve damage, hospital admission, stroke, pneumothorax, death or failure of the procedure are theoretically possible. The patient was educated about the of the procedure and alternative therapies. All questions were answered and the patient freely gave consent to proceed.\par Indication for Fluoroscopy:  This procedure requires the precise placement of the spinal needle into the epidural space.  It is the only way to accurately and safely perform the injection.\par \par \par PROCEDURE NOTE: \par After obtaining written consent, the patient was then positioned on the fluoroscopy table in the prone position with a pillow beneath the pelvis to reduce lumbar lordosis. The lumbar area was prepped with betadine solution and draped in the usual manner. A time out was performed. The fluoroscope was used to identify the L3///L4///L5 vertebral body on the AP projection. It was then rotated into an oblique projection until the superior articulating process of the L5 (inferior) vertebra is projected beneath the 6 o'clock position of the L4 (superior) vertebrae. The 22 gauge 3.5inch needle was inserted in the skin at a point overlying the superior articulating process of the inferior vertebra and aimed for the 6 o'clock position of the superior vertebrae's pedicle.  After the needle contacted bone, a lateral projection was obtained to insure that the needle tip was in proximity with the vertebral body. Paresthesias were not noted.  One ml of Omnipaque 240 was injected and a neurogram was obtained. Following demonstration of the neurogram, 1 ml of Preservative free normal saline and 1 ml of dexamethasone (10mg) was injected. The small volume and relatively high concentration was chosen to preserve selectivity and diagnostic value of the injection. There was no CSF or heme identified.  The L5-S1 level was injected in identical fashion.\par \par \par \par \par Epidurogram: The nerve root was observed in its outline on the neurogram. Distal and proximal spread was noted.\par \par Findings: Lumbar Spine AP and oblique views with x-ray degenerative changes noted.\par \par Complications: none.  \par \par Disposition: I have examined the patient and there are no new physical findings since original presentation. The patient was discharged home with a . The discharge instruction sheet was given to the patient. Motor function was intact.\par \par Comment: 1st TFESI today, depending on effectiveness would schedule 2nd TFESI in 1-2 weeks vs caudal epidural steroid vs follow up in office. Call if any problems\par \par \par \par \par This document was signed by:\par \par Poncho Reid MD  \par Board Certified, Anesthesiology  \par Board Certified, Pain Medicine   \par \par \par

## 2023-04-06 RX ORDER — TRAMADOL HYDROCHLORIDE 50 MG/1
50 TABLET, COATED ORAL 3 TIMES DAILY
Qty: 12 | Refills: 0 | Status: ACTIVE | COMMUNITY
Start: 2023-04-06 | End: 1900-01-01

## 2023-04-10 ENCOUNTER — APPOINTMENT (OUTPATIENT)
Dept: NEUROLOGY | Facility: CLINIC | Age: 68
End: 2023-04-10

## 2023-04-17 ENCOUNTER — APPOINTMENT (OUTPATIENT)
Dept: PAIN MANAGEMENT | Facility: CLINIC | Age: 68
End: 2023-04-17
Payer: MEDICARE

## 2023-04-17 ENCOUNTER — APPOINTMENT (OUTPATIENT)
Dept: PAIN MANAGEMENT | Facility: CLINIC | Age: 68
End: 2023-04-17

## 2023-04-17 VITALS — BODY MASS INDEX: 20.91 KG/M2 | WEIGHT: 118 LBS | HEIGHT: 63 IN

## 2023-04-17 PROCEDURE — 99214 OFFICE O/P EST MOD 30 MIN: CPT

## 2023-04-17 NOTE — HISTORY OF PRESENT ILLNESS
[FreeTextEntry1] : Ms. Garcia is a 67 year old female presenting to our walk in clinic to establish care for her right sided lower back/leg pain. She states last week she slipped and missed one step. Since then, she has noticed a severe flare up of right sided leg pain.\par \par It is to be noted that she does have a history of pins and screws in her right leg.\par \par She has ongoing pain in her right side of her lower back and into the right lower extremity. She notes numbness/tingling into the right foot. She states there are tingling all the way into the toes. She states she cannot put pressure over the right leg. She is using a cane/wheelchair secondary to the pain. She states the pain is severe and keeps her up at night. She rates the pain at a 8/10 on the pain scale. She states the pain is constant in nature.\par \par She denies any changes in bowel/bladder habits or any saddle paresthesia. \par \par TODAY:  Since last visit, she underwent a right L4-5, L5-S1 transforaminal epidural steroid injection on 04/03/2023 with approximately 85% relief for 2 weeks following this procedure. She noticed decrease in pain along with increase in function and better ability to sleep. \par \par She is presenting today with lower back pain that is slowly returning. She states the pain is in the back and radiates into the bilateral lower extremities. She has associated numbness, tingling along with spasms in the back and into the legs. She states the pain is worse with standing or walking for extended periods of time. She rates the pain at a 7/10 on the pain scale. She states the pain is present throughout the day and constant.

## 2023-04-17 NOTE — PHYSICAL EXAM
[de-identified] : BACK - tenderness into the lumbar paraspinals. ROM restricted. Pain with flexion. Positive SLR on the right.\par \par Antalgic gait: using a cane/wheelchair.

## 2023-04-20 ENCOUNTER — RX RENEWAL (OUTPATIENT)
Age: 68
End: 2023-04-20

## 2023-04-24 ENCOUNTER — APPOINTMENT (OUTPATIENT)
Dept: PAIN MANAGEMENT | Facility: CLINIC | Age: 68
End: 2023-04-24
Payer: MEDICARE

## 2023-04-24 VITALS — HEIGHT: 63 IN | BODY MASS INDEX: 20.91 KG/M2 | WEIGHT: 118 LBS

## 2023-04-24 DIAGNOSIS — M54.16 RADICULOPATHY, LUMBAR REGION: ICD-10-CM

## 2023-04-24 PROCEDURE — 99214 OFFICE O/P EST MOD 30 MIN: CPT

## 2023-04-24 NOTE — PHYSICAL EXAM
[de-identified] : BACK - tenderness into the lumbar paraspinals. ROM restricted. Pain with flexion. Positive SLR on the right.

## 2023-04-24 NOTE — DATA REVIEWED
[FreeTextEntry1] : AP lateral flexion-extension lumbar spine x-rays. The patient is significant degenerative disc disease and a degenerative scoliosis. The patient also has spondylolisthesis at L5-S1 L4-5. She also has an upper lumbar spine compression deformity consistent with a fall that she states that she had in the fall. \par \par MRI of the lumbar spine taken on 04/17/2023 showed diffuse degenerative lower thoracic/lumbar disc change/disc protrusions.  Lower lumbar degenerative facet disease.  Grade 1 2 L5-S1 spondylolisthesis.  Bilateral L5 pars defects.  Lumbar dextroscoliosis.  Left posterior lateral T12-L1, right posterior lateral L1-2 disc herniations.  Right L4-5 caudally extruded disc herniation with suspected right L5 nerve root involvement.  Multilevel foraminal disc extension/narrowing question of right L5 nerve root compression within the right L5-S1 neural foramen.  Multilevel also vertebral height.  Severe left L5 endplate depression which may be acute/subacute.  No significant spinal canal stenosis.

## 2023-04-24 NOTE — ASSESSMENT
[FreeTextEntry1] : 67-year-old female presenting with ongoing lumbar radicular pain. Patient is presenting with radicular pain with impairment in ADLs and functionality.  The pain has not responded to conservative care, including medications, stretching, as well as active modalities, such as physical therapy.  Imaging studies as well as physical exam findings corroborate the symptomatology and radicular pain.  We will proceed with an epidural at this point. Follow up in 6 weeks will be made for reassessment. I have explained the findings to the patient and all questions have been answered. \par \par Right L4-5, L5-S1 transforaminal epidural steroid injection with sedation.\par \par Patient had a MRI that shows a radicular component along with pain referred into the lower extremity. Patient has trialed rehab (Home exercise, physical therapy or chiropractic care) and medications I will schedule a L4-5, L5-S1 SNRI. \par \par Risk, benefits, pros and cons of procedure were explained to the patient using models and diagrams and their questions were answered. \par \par \par The patient has severe anxiety of procedures that necessitates monitored anesthesia care (MAC). The procedure performed will be close to major nerves, arteries, and spinal cord and/or joint structures. Due to the proximity of these structures, we need the patient to be still during the procedure.  With the help of MAC, this will be safely achieved and decrease the risk of any complications.\par \par Entered by Fela Adams, acting as scribe for Dr. Reid.\par  \par The documentation recorded by the scribe, in my presence, accurately reflects the service I personally performed, and the decisions made by me with my edits as appropriate.\par  \par Best Regards, \par Poncho Reid MD \par Board Certified, Anesthesiology \par Board Certified, Pain Medicine\par

## 2023-04-24 NOTE — HISTORY OF PRESENT ILLNESS
[FreeTextEntry1] : Ms. Garcia is a 67 year old female presenting to our walk in clinic to establish care for her right sided lower back/leg pain. She states last week she slipped and missed one step. Since then, she has noticed a severe flare up of right sided leg pain.\par \par It is to be noted that she does have a history of pins and screws in her right leg.\par \par She has ongoing pain in her right side of her lower back and into the right lower extremity. She notes numbness/tingling into the right foot. She states there are tingling all the way into the toes. She states she cannot put pressure over the right leg. She is using a cane/wheelchair secondary to the pain. She states the pain is severe and keeps her up at night. She rates the pain at a 8/10 on the pain scale. She states the pain is constant in nature.\par \par She denies any changes in bowel/bladder habits or any saddle paresthesia. \par \par TODAY:  Revisit encounter.\par \par She has continuing lower back pain. She states the pain is in the back and radiates into the bilateral lower extremities. She has associated numbness, tingling along with spasms in the back and into the legs. She states the pain is mainly in her calf. She states when she is standing or using her legs for extended periods of time she feels like her calf is going to "explode". She states the pain is worse with standing or walking for extended periods of time. She rates the pain at a 6/10 on the pain scale. She states the pain is present throughout the day and constant.

## 2023-04-27 ENCOUNTER — APPOINTMENT (OUTPATIENT)
Dept: ORTHOPEDIC SURGERY | Facility: CLINIC | Age: 68
End: 2023-04-27

## 2023-04-28 NOTE — DISCHARGE NOTE PROVIDER - NSDCQMAMI_CARD_ALL_CORE
No Quinolones Pregnancy And Lactation Text: This medication is Pregnancy Category C and it isn't know if it is safe during pregnancy. It is also excreted in breast milk.

## 2023-05-02 ENCOUNTER — APPOINTMENT (OUTPATIENT)
Dept: PAIN MANAGEMENT | Facility: CLINIC | Age: 68
End: 2023-05-02

## 2023-05-03 ENCOUNTER — APPOINTMENT (OUTPATIENT)
Dept: PAIN MANAGEMENT | Facility: CLINIC | Age: 68
End: 2023-05-03
Payer: MEDICARE

## 2023-05-03 VITALS — BODY MASS INDEX: 20.91 KG/M2 | HEIGHT: 63 IN | WEIGHT: 118 LBS

## 2023-05-03 DIAGNOSIS — M51.36 OTHER INTERVERTEBRAL DISC DEGENERATION, LUMBAR REGION: ICD-10-CM

## 2023-05-03 DIAGNOSIS — M51.26 OTHER INTERVERTEBRAL DISC DEGENERATION, LUMBAR REGION: ICD-10-CM

## 2023-05-03 PROCEDURE — 99214 OFFICE O/P EST MOD 30 MIN: CPT

## 2023-05-04 NOTE — HISTORY OF PRESENT ILLNESS
[FreeTextEntry1] : Ms. Garcia is a 67 year old female presenting to our walk in clinic to establish care for her right sided lower back/leg pain. She states last week she slipped and missed one step. Since then, she has noticed a severe flare up of right sided leg pain.\par \par It is to be noted that she does have a history of pins and screws in her right leg.\par \par She has ongoing pain in her right side of her lower back and into the right lower extremity. She notes numbness/tingling into the right foot. She states there are tingling all the way into the toes. She states she cannot put pressure over the right leg. She is using a cane/wheelchair secondary to the pain. She states the pain is severe and keeps her up at night. She rates the pain at a 8/10 on the pain scale. She states the pain is constant in nature.\par \par She denies any changes in bowel/bladder habits or any saddle paresthesia. \par \par TODAY:  Revisit encounter.\par \par To be reiterated, she underwent a right  L4-5, L5-S1 transforaminal epidural steroid injection on 04/03/2023.  This injection provided her with approximately 80% improvement in pain along with increase in function and better ability to walk for one month following this procedure.  Today, she states her usual lower back pain is returning.\par \par She states the pain is in the back and radiates into the bilateral lower extremities. She has associated numbness, tingling along with spasms in the back and into the legs. She states the pain is mainly in her calf. She states when she is standing or using her legs for extended periods of time she feels like her calf is going to "explode". She states the pain is worse with standing or walking for extended periods of time. She rates the pain at a 6/10 on the pain scale. She states the pain is present throughout the day and constant.

## 2023-05-04 NOTE — PHYSICAL EXAM
[de-identified] : BACK - tenderness into the lumbar paraspinals. ROM restricted. Pain with flexion. Positive SLR on the right.

## 2023-05-04 NOTE — ASSESSMENT
[FreeTextEntry1] : 67-year-old female presenting with ongoing lumbar radicular pain. Patient is presenting with radicular pain with impairment in ADLs and functionality.  The pain has not responded to conservative care, including medications, stretching, as well as active modalities, such as physical therapy.  Imaging studies as well as physical exam findings corroborate the symptomatology and radicular pain.  We will proceed with an epidural at this point. Follow up in 6 weeks will be made for reassessment. I have explained the findings to the patient and all questions have been answered. \par \par Patient had a MRI that shows a radicular component along with pain referred into the lower extremity. Patient has trialed rehab (Home exercise, physical therapy or chiropractic care) and medications I will schedule a L5-S1 interlaminar.\par \par \par Risk, benefits, pros and cons of procedure were explained to the patient using models and diagrams and their questions were answered. \par \par \par The patient has severe anxiety of procedures that necessitates monitored anesthesia care (MAC). The procedure performed will be close to major nerves, arteries, and spinal cord and/or joint structures. Due to the proximity of these structures, we need the patient to be still during the procedure.  With the help of MAC, this will be safely achieved and decrease the risk of any complications.\par \par Entered by Fela Adams, acting as scribe for Dr. Reid.\par  \par The documentation recorded by the scribe, in my presence, accurately reflects the service I personally performed, and the decisions made by me with my edits as appropriate.\par  \par Best Regards, \par Poncho Reid MD \par Board Certified, Anesthesiology \par Board Certified, Pain Medicine\par

## 2023-05-05 ENCOUNTER — OUTPATIENT (OUTPATIENT)
Dept: OUTPATIENT SERVICES | Facility: HOSPITAL | Age: 68
LOS: 1 days | End: 2023-05-05
Payer: MEDICARE

## 2023-05-05 DIAGNOSIS — M54.50 LOW BACK PAIN, UNSPECIFIED: ICD-10-CM

## 2023-05-05 PROCEDURE — 72110 X-RAY EXAM L-2 SPINE 4/>VWS: CPT | Mod: 26,59

## 2023-05-05 PROCEDURE — 72110 X-RAY EXAM L-2 SPINE 4/>VWS: CPT

## 2023-05-05 PROCEDURE — 72082 X-RAY EXAM ENTIRE SPI 2/3 VW: CPT

## 2023-05-05 PROCEDURE — 72082 X-RAY EXAM ENTIRE SPI 2/3 VW: CPT | Mod: 26

## 2023-05-06 DIAGNOSIS — M54.50 LOW BACK PAIN, UNSPECIFIED: ICD-10-CM

## 2023-05-08 ENCOUNTER — APPOINTMENT (OUTPATIENT)
Dept: PAIN MANAGEMENT | Facility: CLINIC | Age: 68
End: 2023-05-08

## 2023-05-18 ENCOUNTER — RX RENEWAL (OUTPATIENT)
Age: 68
End: 2023-05-18

## 2023-05-18 RX ORDER — MELOXICAM 15 MG/1
15 TABLET ORAL
Qty: 30 | Refills: 0 | Status: ACTIVE | COMMUNITY
Start: 2023-03-20 | End: 1900-01-01

## 2023-05-22 ENCOUNTER — APPOINTMENT (OUTPATIENT)
Dept: PAIN MANAGEMENT | Facility: CLINIC | Age: 68
End: 2023-05-22
Payer: MEDICARE

## 2023-05-22 PROCEDURE — 94761 N-INVAS EAR/PLS OXIMETRY MLT: CPT | Mod: 59

## 2023-05-22 PROCEDURE — 00630 ANES PX LUMBAR REGION NOS: CPT | Mod: QZ,P2

## 2023-05-22 PROCEDURE — 62323 NJX INTERLAMINAR LMBR/SAC: CPT

## 2023-05-22 PROCEDURE — 93770 DETERMINATION VENOUS PRESS: CPT | Mod: 59

## 2023-05-22 PROCEDURE — 72100 X-RAY EXAM L-S SPINE 2/3 VWS: CPT

## 2023-05-22 PROCEDURE — 93040 RHYTHM ECG WITH REPORT: CPT | Mod: 59

## 2023-05-22 NOTE — PROCEDURE
[FreeTextEntry1] : Interlaminar Lumbar Epidural Steroid Injection [FreeTextEntry3] : Date:  2023\par \par Patient: Ioana Garcia\par \par :  1955\par \par \par \par \par \par Preoperative Diagnosis: Lumbar Radiculopathy\par Postoperative Diagnosis: Lumbar Radiculopathy\par \par \par \par Procedure:\par 1. Interlaminar L5-S1 Lumbar Epidural Injection under fluoroscopy\par 2. Epidurography\par 3. Fluoroscopic guidance and localization of needle\par \par \par \par Physician: Poncho Reid M.D.\par Anesthesiologist/CRNA: Ms. Parmar\par Anesthesia: MAC, Versed 2mg, fentanyl 100 mcg \par Medical Necessity:  Failure of conservative management.\par Consent:  Though unusual, the possible complications including infection, bleeding, nerve damage, hospital admission, stroke, pneumothorax, death or failure of the procedure are theoretically possible. The patient was educated about the of the procedure and alternative therapies. All questions were answered and the patient freely gave consent to proceed.\par Indication for Fluoroscopy:  This procedure requires the precise placement of the spinal needle into the epidural space.  It is the only way to accurately and safely perform the injection.\par \par \par \par PROCEDURE NOTE:  After obtaining written consent, in the sitting position an IV was placed in the upper extremity by the CRNA. The patient was placed in the prone position. The lumbosacral region was prepped with betadine and draped in usual sterile fashion. A time out was performed.  The L5-S1 interspace was identified using fluoroscopy. The skin was infiltrated with lidocaine 2% -- 2mL for subcutaneous analgesia. The epidural space was identified using a 17g touhy needle with a midline approach using a loss of resistance technique. 2mL omnipaque was used to define the space. A solution of 6ml of preservative-free sterile saline and 1ml of depomedrol 80mg was infused in 1mL increments slowly with minimal pressure on the syringe into the epidural space. The procedure was tolerated well. There was no evidence of CSF, paresthesias, or heme.  \par  \par Epidurogram: Distal and proximal spread was noted.\par \par Findings: Lumbar Spine AP and oblique views with x-ray degenerative changes noted.\par \par Complications: none. \par \par Disposition: I have examined the patient and there are no new physical findings since original presentation. The patient was discharged home with a . The discharge instruction sheet was given to the patient. Motor function was intact.\par \par Comment: 2nd Interlaminar BRADLEY today, depending on effectiveness would schedule 3rd Interlaminar in 1-2 weeks vs caudal epidural steroid vs follow up in office. Call if any problems\par \par \par \par \par \par Poncho Reid MD \par Board Certified, Anesthesiology \par Board Certified, Pain Medicine \par \par

## 2023-05-30 ENCOUNTER — APPOINTMENT (OUTPATIENT)
Dept: PAIN MANAGEMENT | Facility: CLINIC | Age: 68
End: 2023-05-30

## 2023-06-07 ENCOUNTER — NON-APPOINTMENT (OUTPATIENT)
Age: 68
End: 2023-06-07

## 2023-06-13 ENCOUNTER — APPOINTMENT (OUTPATIENT)
Dept: PAIN MANAGEMENT | Facility: CLINIC | Age: 68
End: 2023-06-13
Payer: MEDICARE

## 2023-06-13 PROCEDURE — 62323 NJX INTERLAMINAR LMBR/SAC: CPT

## 2023-06-13 PROCEDURE — 94761 N-INVAS EAR/PLS OXIMETRY MLT: CPT | Mod: 59

## 2023-06-13 PROCEDURE — 93770 DETERMINATION VENOUS PRESS: CPT

## 2023-06-13 PROCEDURE — 93040 RHYTHM ECG WITH REPORT: CPT | Mod: 59

## 2023-06-13 PROCEDURE — 00630 ANES PX LUMBAR REGION NOS: CPT | Mod: QZ,P2

## 2023-06-13 PROCEDURE — 72100 X-RAY EXAM L-S SPINE 2/3 VWS: CPT

## 2023-06-13 NOTE — PROCEDURE
[FreeTextEntry3] : Interlaminar Lumbar Epidural Steroid Injection\par  \par \par Date:  2023\par \par Patient: Ioana Garcia\par \par :  1955\par \par \par \par \par \par Preoperative Diagnosis: Lumbar Radiculopathy\par Postoperative Diagnosis: Lumbar Radiculopathy\par \par \par \par Procedure:\par 1. Interlaminar L5-S1 Lumbar Epidural Injection under fluoroscopy\par 2. Epidurography\par 3. Fluoroscopic guidance and localization of needle\par \par \par \par Physician: Poncho Reid M.D.\par Anesthesiologist/CRNA: Mr Lomax\par Anesthesia: MAC Versed 2mg/ Fentanyl 100 mcg IVP\par Medical Necessity:  Failure of conservative management.\par Consent:  Though unusual, the possible complications including infection, bleeding, nerve damage, hospital admission, stroke, pneumothorax, death or failure of the procedure are theoretically possible. The patient was educated about the of the procedure and alternative therapies. All questions were answered and the patient freely gave consent to proceed.\par Indication for Fluoroscopy:  This procedure requires the precise placement of the spinal needle into the epidural space.  It is the only way to accurately and safely perform the injection.\par \par \par \par PROCEDURE NOTE:  After obtaining written consent, in the sitting position an IV was placed in the upper extremity by the CRNA. The patient was placed in the prone position. The lumbosacral region was prepped with betadine and draped in usual sterile fashion. A time out was performed.  The L5-S1 interspace was identified using fluoroscopy. The skin was infiltrated with lidocaine 2% -- 2mL for subcutaneous analgesia. The epidural space was identified using a 17g touhy needle with a midline approach using a loss of resistance technique. 2mL omnipaque was used to define the space. A solution of 6ml of preservative-free sterile saline and 1ml of depomedrol 80mg was infused in 1mL increments slowly with minimal pressure on the syringe into the epidural space. The procedure was tolerated well. There was no evidence of CSF, paresthesias, or heme.  \par  \par Epidurogram: Distal and proximal spread was noted.\par \par Findings: Lumbar Spine AP and oblique views with x-ray degenerative changes noted.\par \par Complications: none. \par \par Disposition: I have examined the patient and there are no new physical findings since original presentation. The patient was discharged home with a . The discharge instruction sheet was given to the patient. Motor function was intact.\par \par Comment: 2nd Interlaminar BRADLEY today, depending on effectiveness would schedule 3rd Interlaminar in 1-2 weeks vs caudal epidural steroid vs follow up in office. Call if any problems\par \par \par \par This document was signed by: \par \par Poncho Reid MD \par Board Certified, Anesthesiology \par Board Certified, Pain Medicine \par \par

## 2023-06-27 ENCOUNTER — APPOINTMENT (OUTPATIENT)
Dept: PAIN MANAGEMENT | Facility: CLINIC | Age: 68
End: 2023-06-27

## 2023-08-15 NOTE — ED ADULT NURSE NOTE - NS ED NURSE LEVEL OF CONSCIOUSNESS SPEECH
"PSYCHIATRY INPATIENT CONSULT NOTE      8/15/2023 10:28 AM   Valencia Solorzano   1992   [id number]           DATE OF ADMISSION: 8/13/2023  6:43 PM    SITE: [facility]    CURRENT LEGAL STATUS: Patient does not currently meet PEC/CEC criteria due to not currently being an imminent threat to self/others and not being gravely disabled 2/2 mental illness at this time.       HISTORY    Chief Complaint / Reason for Psychiatry Consult: alcohol usage      From hospital medicine H&P:  "HPI: Valencia Solorzano is a 30 yr male with alcohol use disorder who presents with tremors.  Patient states that his last drink was this morning.  He reports attempting to stop drinking several times over the past few years.  He drinks nearly a 5th of hard liquor a day.  He is currently complaining of nausea.  Patient often gets tremors when he attempts to stop although he has never had a seizure.  He states he wants to stop since he is hurting his body.  He comes in for further evaluation and care.  In the ED, triage vitals were unremarkable.  CBC showed no leukocytosis.  CMP was significant for hypernatremia, anion gap of 20.  Alk-phos was elevated as well as AST and ALT."    HPI   Valencia Solorzano is a 30 y.o. male with a past medical history of alcohol use disorder, otherwise reports no other medical history, currently being treated by their inpatient primary treatment team for a principal problem of tremors, potential alcohol withdrawal.  Psychiatry was originally consulted as noted above.  The patient was seen and examined.  The chart was reviewed.  On examination today, the patient was AO4, with tremors visible in his right hand, no asterix.  He denies any current/recent passive/active SI/HI.  He denies any adverse effects to their current medication regimen.  Regarding current medical/physical complaints, he endorses improved tremors from yesterday and watery diarrhea yesterday, characteristic for him after drinking. He says the nausea " "and vomiting after drinking that he usually experiences after drinking is not currently present. He feels low in energy. He denies any other medical complaints at this time.  NAD was observed during the examination.  After discussing the risks, benefits, alt vs no treatment, he is agreeable and desiring a trial of alcohol rehabilitation in an effort to improve alcohol addiction.     The patient reports drinking since age 17 but began heavily drinking after college. He endorses patterns of binge drinking, often a week or week and a half of drinking followed by 3-4 days to recover, sometimes requiring him to take time off of work. His drinking varies but 2 pints of whiskey followed by a 40oz would be considered average. He smokes in concordance with the type of alcohol he drinks, a pack or more at a time is not unusual.     After drinking, he experiences intense vomiting, stomach cramps, and tremors. He knows not to eat due to the excessive vomiting. He understands to drink electrolytes to try to alleviate the cramps. He does not take vitamins. He does not believe he has experienced a seizure before, but endorses 3-4 days of sleep and recovery required for him to be functional again.    He reports high tolerance of alcohol, and describes the urge to drink "at the sight of alcohol". He has tried to quit on his own multiple times and has been to rehab once in 2021 at [facility] for 30 days after a 7-8 day detox, at the [facility] or his mother. The patient feels like he was not engaged in his rehab for the first half and only actively started participating later on. He reports relapsing on the way home.     He lives with his mom and has family support. He reports hiding alcohol in order to drink at home. He is employed and his employer is supportive of him taking time off in order to recover, however, he has lost previous jobs due to need to take time off after drinking to recover. He feels he spends too much money on alcohol, " "including $450 in a week. However, his employment is good and he does not feel he has financial stress. He was previously a  for 8th grade and has held various jobs. He was also a football player and , with education in physiology and exercise, and tries to keep healthy.    He reports "feeling like a failure". He reports a lack of sleep and a need to sleep off alcohol after drinking for several days. He denies anhedonia. When not drinking, he feels he has the energy to play sports and be active. He endorses changes in concentration related to alcohol. He currently has a tremor in his right hand, improved from whole body tremors on admission. He endorses shaking as a regular effect of drinking. He denies SI/HI and does not have access to a gun.    He feels motivated to quit, citing health effects and wanting to live a long life. His insight is good.      Collateral:  N/A      Psychiatric Review Of Systems - Currently, the patient is endorsing and/or denying the following:    Symptoms of Depression:   Denies symptoms out of proportion to situation; denies excessive diminished mood or loss of interest/anhedonia; irritability, diminished energy outside of alcohol withdrawal, diminished concentration or cognition or indecisiveness outside of alcohol impairment, PMA/R, suicidal ideations  Endorses loss of sleep, some nights 3-4 hours, avoids food due to n/v after drinking, feels "like a failure" but no SI    Sleep:  Endorses lack of sleep, also requires 3-4 days of sleeping in and recovering after a week of drinking    Suicidal/Homicidal ideations:   Denies active/passive ideations, organized plans, future intentions    Symptoms of psychosis:   Denies hallucinations, delusions, disorganized thinking, disorganized behavior or abnormal motor behavior, or negative symptoms (diminshed emotional expression, avolition, anhedonia, alogia, asociality     Symptoms of chika or hypomania:   Denies elevated, " "expansive, or irritable mood with increased energy or activity; with inflated self-esteem or grandiosity, decreased need for sleep, increased rate of speech, FOI or racing thoughts, distractibility, increased goal directed activity or PMA, risky/disinhibited behavior    Symptoms of DAYAN:   Denies excessive anxiety/worry/fear, more days than not, about numerous issues, difficult to control, with restlessness, fatigue, poor concentration, irritability, muscle tension, sleep disturbance; causes functionally impairing distress     Symptoms of Panic Disorder:   Denies recurrent panic attacks, precipitated or un-precipitated, source of worry and/or behavioral changes secondary; with or without agoraphobia    Symptoms of PTSD:   Denies h/o trauma; re-experiencing/intrusive symptoms, avoidant behavior, negative alterations in cognition or mood, or hyperarousal symptoms; with or without dissociative symptoms     Symptoms of OCD:   Denies obsessions   Endorses compulsive drinking "at the sight of alcohol"     Symptoms of Eating Disorders:   Denies anorexia, bulimia or binging    Substance Use:   Endorses alcohol intoxication, withdrawal, tolerance, used in larger amounts or duration than intended, including unsuccessful attempts to limit or quit, rehab in 2021, increased time engaging in or seeking out, cravings or strong desire to use, failure to fulfill obligations, negative consequences in social/interpersonal/occupational,/recreational areas, medical or psychological consequences   Concurrent tobacco smoking with alcohol use  See history above      Psychiatric Review Of Systems - Is patient experiencing or having changes in:  sleep: no, generally gets little sleep, attributes it to alcohol; when he drinks for a week, he will need to take 3-4 days off to sleep it off  appetite: yes, does not eat after alcohol because he knows he will vomit  weight: no  energy/anergy: yes  interest/pleasure/anhedonia: no  somatic symptoms: " no  libido: no  guilty/hopelessness: yes  concentration: yes, after drinking  S.I.B.s/risky behavior: no  SI/SA:  no    anxiety/panic: no  Agoraphobia:  no  Social phobia:  no  Recurrent nightmares:  no  hyper startle response:  no  Avoidance: no  Recurrent thoughts:  no  Recurrent behaviors:  yes, feels like he has an urge to drink at the sight of alcohol    Irritability: no, not currently, but is aware his behavior can change with alcohol  Racing thoughts: no  Impulsive behaviors: yes, drinking  Pressured speech:  no    Paranoia:no  Delusions: no  AVH:no      PSYCHOTHERAPY ADD-ON [phone]   30 (16-37*) minutes    Time: *** minutes  Participants: Met with patient    Therapeutic Intervention Type: behavior modifying psychotherapy, supportive psychotherapy  Why chosen therapy is appropriate versus another modality: relevant to diagnosis, patient responds to this modality, evidence based practice    Target symptoms: alcohol abuse  Primary focus: alcohol  Psychotherapeutic techniques: supportive and psychodynamic techniques; psycho-education; deep breathing exercises; CBT; problem solving techniques and managing life stressors    Outcome monitoring methods: self-report, observation    Patient's response to intervention:  The patient's response to intervention is accepting, motivated.    Progress toward goals:  The patient's progress toward goals is good.      ROS  General ROS: negative for - chills, fatigue, fever or night sweats  Ophthalmic ROS: negative for - blurry vision, double vision or eye pain  ENT ROS: negative for - sinus pain, headaches, sore throat or visual changes  Allergy and Immunology ROS: negative for - hives, itchy/watery eyes or nasal congestion  Hematological and Lymphatic ROS: negative for - bleeding problems, bruising, jaundice or pallor  Endocrine ROS: negative for - galactorrhea, hot flashes, mood swings, palpitations or temperature intolerance  Respiratory ROS: negative for - cough, hemoptysis,  shortness of breath, tachypnea or wheezing  Cardiovascular ROS: negative for - chest pain, dyspnea on exertion, loss of consciousness, palpitations, rapid heart rate or shortness of breath  Gastrointestinal ROS: negative for - appetite loss, nausea, abdominal pain, blood in stools, change in bowel habits, constipation or diarrhea  Genito-Urinary ROS: negative for - incontinence, nocturia or pelvic pain  Musculoskeletal ROS: negative for - joint stiffness, joint swelling, joint pain or muscle pain   Neurological ROS: negative for - behavioral changes, confusion, dizziness, memory loss, numbness/tingling or seizures  Dermatological ROS: negative for dry skin, hair changes, pruritus or rash  Psychiatric ROS: see detailed psychiatric ROS above in history section       Past Psychiatric History:  Previous Medication Trials:  nne outpatient, but has been to rehab    Previous Psychiatric Hospitalizations:  detox 7-8 days then rehab 30 days at [facility] in 2021    Previous Suicide Attempts: no   History of Violence: no  Outpatient Psychiatrist: no    Social History:  Marital Status: not   Children: 0   Employment Status/Info: currently employed  Education: college graduate  Special Ed: no  : no  Adventism:  Protestant  Housing Status:  Lives with mom  Hobbies/Leisure time: yes  History of phys/sexual abuse: no  Access to gun: no    Family Psychiatric History: unknown    Substance Abuse History:  Recreational Drugs:  none  Use of Alcohol: heavy  Rehab History:yes once in 2021 at [facility] for 30 days; relapse on the way home  Tobacco Use:yes smokes when he drinks  Use of Caffeine:  occasionally mixes coke with alcohol  Use of OTC: N/A  Legal consequences of chemical use: no    Legal History:  Past Charges/Incarcerations:no   Pending charges:no     Psychosocial Stressors: patient has insight that drinking affects his relationships with family, keeps him from being able to go to work; has lost previous jobs due to need to  sleep in after drinking.   Functioning Relationships: good support system and good relationship with parents  Strengths AND Liabilities  Strength: Patient accepts guidance/feedback, Strength: Patient is expressive/articulate., Strength: Patient is motivated for change., Strength: Patient has positive support network., Strength: Patient has reasonable judgment., Strength: Patient is stable.      PAST MEDICAL & SURGICAL HISTORY   No past medical history on file.  No past surgical history on file.    NEUROLOGIC HISTORY  Seizures: denies   Head trauma: minimal with sports     FAMILY HISTORY   No family history on file.    ALLERGIES   Review of patient's allergies indicates:  No Known Allergies    CURRENT MEDICATION REGIMEN   Home Meds:   Prior to Admission medications    Not on File       OTC Meds: denies    Scheduled Meds:    diazePAM  10 mg Oral Q8H    Followed by    [START ON 8/17/2023] diazePAM  5 mg Oral Q8H    Followed by    [START ON 8/20/2023] diazePAM  2 mg Oral Q8H    folic acid  1 mg Oral Daily    heparin (porcine)  5,000 Units Subcutaneous Q8H    multivitamin  1 tablet Oral Daily    nicotine  1 patch Transdermal Daily    polyethylene glycol  17 g Oral Daily    thiamine (B-1) 250 mg in dextrose 5 % (D5W) 100 mL IVPB  250 mg Intravenous Daily    Followed by    [START ON 8/17/2023] thiamine  100 mg Oral TID      PRN Meds: acetaminophen, glucagon (human recombinant), glucose, glucose, lorazepam, LORazepam, melatonin, naloxone, ondansetron, simethicone, sodium chloride 0.9%   Psychotherapeutics (From admission, onward)      Start     Stop Route Frequency Ordered    08/20/23 1400  diazePAM tablet 2 mg        See Hyperspace for full Linked Orders Report.    08/23/23 1359 Oral Every 8 hours 08/14/23 1412    08/17/23 1400  diazePAM tablet 5 mg        See Hyperspace for full Linked Orders Report.    08/20/23 1359 Oral Every 8 hours 08/14/23 1412    08/14/23 1415  diazePAM tablet 10 mg        See Hyperspace for full  Linked Orders Report.    08/17/23 1359 Oral Every 8 hours 08/14/23 1412    08/13/23 2135  LORazepam tablet 2 mg         -- Oral Every 4 hours PRN 08/13/23 2036 08/13/23 2135  LORazepam injection 2 mg         -- IV Every 2 hours PRN 08/13/23 2036            LABORATORY DATA   Recent Results (from the past 72 hour(s))   CBC auto differential    Collection Time: 08/13/23  7:00 PM   Result Value Ref Range    WBC 8.71 3.90 - 12.70 K/uL    RBC 4.59 (L) 4.60 - 6.20 M/uL    Hemoglobin 14.4 14.0 - 18.0 g/dL    Hematocrit 43.3 40.0 - 54.0 %    MCV 94 82 - 98 fL    MCH 31.4 (H) 27.0 - 31.0 pg    MCHC 33.3 32.0 - 36.0 g/dL    RDW 13.9 11.5 - 14.5 %    Platelets 274 150 - 450 K/uL    MPV 9.9 9.2 - 12.9 fL    Immature Granulocytes 0.1 0.0 - 0.5 %    Gran # (ANC) 4.5 1.8 - 7.7 K/uL    Immature Grans (Abs) 0.01 0.00 - 0.04 K/uL    Lymph # 3.0 1.0 - 4.8 K/uL    Mono # 1.1 (H) 0.3 - 1.0 K/uL    Eos # 0.1 0.0 - 0.5 K/uL    Baso # 0.13 0.00 - 0.20 K/uL    nRBC 0 0 /100 WBC    Gran % 51.2 38.0 - 73.0 %    Lymph % 34.1 18.0 - 48.0 %    Mono % 12.5 4.0 - 15.0 %    Eosinophil % 0.6 0.0 - 8.0 %    Basophil % 1.5 0.0 - 1.9 %    Differential Method Automated    Comprehensive metabolic panel    Collection Time: 08/13/23  7:00 PM   Result Value Ref Range    Sodium 146 (H) 136 - 145 mmol/L    Potassium 4.1 3.5 - 5.1 mmol/L    Chloride 108 95 - 110 mmol/L    CO2 18 (L) 23 - 29 mmol/L    Glucose 105 70 - 110 mg/dL    BUN 13 6 - 20 mg/dL    Creatinine 1.2 0.5 - 1.4 mg/dL    Calcium 8.6 (L) 8.7 - 10.5 mg/dL    Total Protein 7.4 6.0 - 8.4 g/dL    Albumin 4.3 3.5 - 5.2 g/dL    Total Bilirubin 0.9 0.1 - 1.0 mg/dL    Alkaline Phosphatase 48 (L) 55 - 135 U/L    AST 75 (H) 10 - 40 U/L    ALT 54 (H) 10 - 44 U/L    eGFR >60 >60 mL/min/1.73 m^2    Anion Gap 20 (H) 8 - 16 mmol/L   Magnesium    Collection Time: 08/13/23  7:00 PM   Result Value Ref Range    Magnesium 2.1 1.6 - 2.6 mg/dL   Ammonia    Collection Time: 08/13/23  7:00 PM   Result Value Ref Range     Ammonia 45 10 - 50 umol/L   POCT COVID-19 Rapid Screening    Collection Time: 08/13/23 11:17 PM   Result Value Ref Range    POC Rapid COVID Negative Negative     Acceptable Yes    Comprehensive Metabolic Panel (CMP)    Collection Time: 08/14/23  5:40 AM   Result Value Ref Range    Sodium 144 136 - 145 mmol/L    Potassium 3.6 3.5 - 5.1 mmol/L    Chloride 106 95 - 110 mmol/L    CO2 23 23 - 29 mmol/L    Glucose 83 70 - 110 mg/dL    BUN 11 6 - 20 mg/dL    Creatinine 1.1 0.5 - 1.4 mg/dL    Calcium 9.0 8.7 - 10.5 mg/dL    Total Protein 6.9 6.0 - 8.4 g/dL    Albumin 4.0 3.5 - 5.2 g/dL    Total Bilirubin 1.3 (H) 0.1 - 1.0 mg/dL    Alkaline Phosphatase 55 55 - 135 U/L     (H) 10 - 40 U/L    ALT 72 (H) 10 - 44 U/L    eGFR >60 >60 mL/min/1.73 m^2    Anion Gap 15 8 - 16 mmol/L   CBC with Automated Differential    Collection Time: 08/14/23  5:40 AM   Result Value Ref Range    WBC 8.92 3.90 - 12.70 K/uL    RBC 4.49 (L) 4.60 - 6.20 M/uL    Hemoglobin 14.0 14.0 - 18.0 g/dL    Hematocrit 41.8 40.0 - 54.0 %    MCV 93 82 - 98 fL    MCH 31.2 (H) 27.0 - 31.0 pg    MCHC 33.5 32.0 - 36.0 g/dL    RDW 13.6 11.5 - 14.5 %    Platelets 273 150 - 450 K/uL    MPV 10.0 9.2 - 12.9 fL    Immature Granulocytes 0.1 0.0 - 0.5 %    Gran # (ANC) 4.6 1.8 - 7.7 K/uL    Immature Grans (Abs) 0.01 0.00 - 0.04 K/uL    Lymph # 3.0 1.0 - 4.8 K/uL    Mono # 1.0 0.3 - 1.0 K/uL    Eos # 0.2 0.0 - 0.5 K/uL    Baso # 0.11 0.00 - 0.20 K/uL    nRBC 0 0 /100 WBC    Gran % 51.4 38.0 - 73.0 %    Lymph % 33.6 18.0 - 48.0 %    Mono % 11.2 4.0 - 15.0 %    Eosinophil % 2.5 0.0 - 8.0 %    Basophil % 1.2 0.0 - 1.9 %    Differential Method Automated    Comprehensive Metabolic Panel (CMP)    Collection Time: 08/15/23  3:34 AM   Result Value Ref Range    Sodium 138 136 - 145 mmol/L    Potassium 3.7 3.5 - 5.1 mmol/L    Chloride 105 95 - 110 mmol/L    CO2 24 23 - 29 mmol/L    Glucose 104 70 - 110 mg/dL    BUN 9 6 - 20 mg/dL    Creatinine 1.1 0.5 - 1.4 mg/dL  "   Calcium 9.0 8.7 - 10.5 mg/dL    Total Protein 6.5 6.0 - 8.4 g/dL    Albumin 3.7 3.5 - 5.2 g/dL    Total Bilirubin 0.9 0.1 - 1.0 mg/dL    Alkaline Phosphatase 50 (L) 55 - 135 U/L    AST 90 (H) 10 - 40 U/L    ALT 76 (H) 10 - 44 U/L    eGFR >60 >60 mL/min/1.73 m^2    Anion Gap 9 8 - 16 mmol/L   CBC with Automated Differential    Collection Time: 08/15/23  3:34 AM   Result Value Ref Range    WBC 7.84 3.90 - 12.70 K/uL    RBC 4.21 (L) 4.60 - 6.20 M/uL    Hemoglobin 13.2 (L) 14.0 - 18.0 g/dL    Hematocrit 38.9 (L) 40.0 - 54.0 %    MCV 92 82 - 98 fL    MCH 31.4 (H) 27.0 - 31.0 pg    MCHC 33.9 32.0 - 36.0 g/dL    RDW 12.8 11.5 - 14.5 %    Platelets 236 150 - 450 K/uL    MPV 10.4 9.2 - 12.9 fL    Immature Granulocytes 0.3 0.0 - 0.5 %    Gran # (ANC) 4.0 1.8 - 7.7 K/uL    Immature Grans (Abs) 0.02 0.00 - 0.04 K/uL    Lymph # 2.8 1.0 - 4.8 K/uL    Mono # 0.7 0.3 - 1.0 K/uL    Eos # 0.2 0.0 - 0.5 K/uL    Baso # 0.06 0.00 - 0.20 K/uL    nRBC 0 0 /100 WBC    Gran % 51.3 38.0 - 73.0 %    Lymph % 35.8 18.0 - 48.0 %    Mono % 9.2 4.0 - 15.0 %    Eosinophil % 2.6 0.0 - 8.0 %    Basophil % 0.8 0.0 - 1.9 %    Differential Method Automated       No results found for: "PHENYTOIN", "PHENOBARB", "VALPROATE", "CBMZ"      EXAMINATION    VITALS   Vitals:    08/15/23 0500 08/15/23 0514 08/15/23 0614 08/15/23 0754   BP: (!) 135/97 (!) 135/97  (!) 149/97   BP Location: Left arm Left arm     Patient Position: Lying Lying  Lying   Pulse: 76 76 69 68   Resp: 18 18  18   Temp: 97 °F (36.1 °C) 97 °F (36.1 °C)  97.4 °F (36.3 °C)   TempSrc: Oral Oral  Axillary   SpO2: 97% 97%  96%   Weight:       Height:            CONSTITUTIONAL  General Appearance: NAD, unremarkable, age appropriate, unremarkable, age appropriate, lying in bed    MUSCULOSKELETAL  Muscle Strength and Tone: WNL    Abnormal Involuntary Movements: tremors in right hand, no asterix  Gait and Station: not assessed due to medical acuity    PSYCHIATRIC   Behavior/Cooperation:  normal, " Speaking Coherently cooperative  Speech:  normal tone, normal rate, normal pitch, normal volume  Language: grossly intact, able to name, able to repeat with spontaneous speech  Mood: euthymic, depressed  Affect:  congruent with mood and appropriate to situation/content Normal  Associations: intact; no BUSTER  Thought Process: normal and logical  Thought Content: normal, no suicidality, no homicidality, delusions, or paranoia  Sensorium:  Awake/Delirium/Somnolence  Alert and Oriented: to grossly intact, person, place, situation, time/date  Memory: 3/3 immediate, 3/3 at 5 minutes    Recent:  Intact; able to report recent events   Remote:  Intact; Named 4/4 past presidents   Attention/concentration: appropriate for age/education. Able to spell [id number] & [id number]   Similarities:  Intact; (difference between apple and orange?)  Abstract reasoning:  Intact  Insight:  Intact; understands effect of drinking on health and on life  Judgment: Limited / Intact    CAM ICU Delirium Assessment - NEGATIVE      Is the patient aware of the biomedical complications associated with substance abuse and mental illness? yes    Does the patient have an Advance Directive for Mental Health treatment? no  (If yes, inform patient to bring copy.)      MEDICAL DECISION MAKING    ASSESSMENT      Alcohol Use Disorder since undergrad  Concurrent tobacco use  Day 2 since last drink, improving tremors  CIWA-AR 5 for unilateral hand tremors, non-extended  Improving AST/ALT  Insight is good, patient wishes to stop drinking     RECOMMENDATIONS       - patient does not currently meet PEC/CEC criteria due to not being an imminent threat to self/others and not being gravely disabled 2/2 mental illness.     - Continue CIWA protocol  - Continue diazepam  - Continue IV lorazepam PRN for elevated CIWA  - Continue thamine, folate  - Continue nicotine patch    - Outpatient psych referral, naltrexone  - Rehab consideration     - Begin *** (discussed risks/benefits/alt vs no  treatment with patient)     - Psychotherapy was performed with patient as noted above      - Please have CM/SW assist patient with outpatient mental health f/u for s/p discharge from this facility      - Patient was instructed to call 911 and return to the nearest ED if he/she begins feeling suicidal, homicidal, or gravely disabled      - Thank you for this consult ; will continue to follow patient         Time spent with patient and/or on unit managing/coordinating patient's care (excluding the time spent on psychotherapy): 70 minutes      More than 50% of the time was spent counseling/coordinating care      STAFF:  Payal Oneal, MS3  [facility]  8/15/2023

## 2024-04-12 NOTE — ED ADULT NURSE NOTE - NSFALLRSKPASTHIST_ED_ALL_ED
Care Transitions Initial Follow Up Call    Outreach made within 2 business days of discharge: Yes    Patient: Jessica Boothe Patient : 1935   MRN: [id number]  Reason for Admission: There are no discharge diagnoses documented for the most recent discharge.  Discharge Date: 24       Spoke with: Left msg for Jessica to return call.  Jessica called back.    Discharge department/facility: [facility] Interactive Patient Contact:  Was patient able to fill all prescriptions: Yes   Was patient instructed to bring all medications to the follow-up visit: Yes   Is patient taking all medications as directed in the discharge summary? Yes  Does patient understand their discharge instructions: Yes  Does patient have questions or concerns that need addressed prior to 7-14 day follow up office visit: No    Scheduled appointment with Cardiologist on 2024.      Follow Up  Future Appointments   Date Time Provider Department Center   2024 11:00 AM Oskar Dunlap MD [facility]   2024 10:30 AM SCHEDULE, [facility] - LPN [facility]   2024 10:30 AM SCHEDULE, ADRIANNA DEVICE CHECK ADRIANNA Dunlap MA    
yes

## 2024-07-05 ENCOUNTER — APPOINTMENT (OUTPATIENT)
Dept: ORTHOPEDIC SURGERY | Facility: CLINIC | Age: 69
End: 2024-07-05
Payer: MEDICARE

## 2024-07-05 VITALS — WEIGHT: 196 LBS | BODY MASS INDEX: 29.7 KG/M2 | HEIGHT: 68 IN

## 2024-07-05 VITALS — HEIGHT: 62 IN | BODY MASS INDEX: 20.43 KG/M2 | WEIGHT: 111 LBS

## 2024-07-05 DIAGNOSIS — S93.401A SPRAIN OF UNSPECIFIED LIGAMENT OF RIGHT ANKLE, INITIAL ENCOUNTER: ICD-10-CM

## 2024-07-05 PROCEDURE — 99213 OFFICE O/P EST LOW 20 MIN: CPT | Mod: 25

## 2024-07-05 PROCEDURE — 73610 X-RAY EXAM OF ANKLE: CPT | Mod: RT

## 2024-07-05 PROCEDURE — 73630 X-RAY EXAM OF FOOT: CPT | Mod: RT

## 2024-08-08 NOTE — DISCHARGE NOTE NURSING/CASE MANAGEMENT/SOCIAL WORK - CAREGIVER PHONE NUMBER
Incoming call from pt stating the new pessary  (#4 sm knob incont dish ) is wonderful, very happy with it.    Wondering if she needs to keep 6 week f/u apt  Reaffirmed need to keep apt for pessary check end of August.  Pt voices understanding   [phone]

## 2024-10-28 ENCOUNTER — LABORATORY RESULT (OUTPATIENT)
Age: 69
End: 2024-10-28

## 2024-10-28 ENCOUNTER — APPOINTMENT (OUTPATIENT)
Age: 69
End: 2024-10-28

## 2024-10-28 ENCOUNTER — OUTPATIENT (OUTPATIENT)
Dept: OUTPATIENT SERVICES | Facility: HOSPITAL | Age: 69
LOS: 1 days | End: 2024-10-28
Payer: MEDICARE

## 2024-10-28 DIAGNOSIS — D72.819 DECREASED WHITE BLOOD CELL COUNT, UNSPECIFIED: ICD-10-CM

## 2024-10-28 LAB
HCT VFR BLD CALC: 39 %
HGB BLD-MCNC: 13.3 G/DL
MCHC RBC-ENTMCNC: 30.6 PG
MCHC RBC-ENTMCNC: 34.1 G/DL
MCV RBC AUTO: 89.7 FL
PLATELET # BLD AUTO: 278 K/UL
PMV BLD: 8.7 FL
RBC # BLD: 4.35 M/UL
RBC # FLD: 13.2 %
WBC # FLD AUTO: 5.33 K/UL

## 2024-10-28 PROCEDURE — 80053 COMPREHEN METABOLIC PANEL: CPT

## 2024-10-28 PROCEDURE — 82607 VITAMIN B-12: CPT

## 2024-10-28 PROCEDURE — 36415 COLL VENOUS BLD VENIPUNCTURE: CPT

## 2024-10-28 PROCEDURE — 83520 IMMUNOASSAY QUANT NOS NONAB: CPT

## 2024-10-28 PROCEDURE — 99204 OFFICE O/P NEW MOD 45 MIN: CPT

## 2024-10-28 PROCEDURE — 85027 COMPLETE CBC AUTOMATED: CPT

## 2024-10-28 PROCEDURE — 86038 ANTINUCLEAR ANTIBODIES: CPT

## 2024-10-28 PROCEDURE — 82746 ASSAY OF FOLIC ACID SERUM: CPT

## 2024-10-29 DIAGNOSIS — D72.819 DECREASED WHITE BLOOD CELL COUNT, UNSPECIFIED: ICD-10-CM

## 2024-10-29 LAB
ALBUMIN SERPL ELPH-MCNC: 4.8 G/DL
ALP BLD-CCNC: 65 U/L
ALT SERPL-CCNC: 17 U/L
ANION GAP SERPL CALC-SCNC: 11 MMOL/L
AST SERPL-CCNC: 25 U/L
BILIRUB SERPL-MCNC: 0.7 MG/DL
BUN SERPL-MCNC: 15 MG/DL
CALCIUM SERPL-MCNC: 10.4 MG/DL
CHLORIDE SERPL-SCNC: 93 MMOL/L
CO2 SERPL-SCNC: 29 MMOL/L
CREAT SERPL-MCNC: 0.7 MG/DL
EGFR: 94 ML/MIN/1.73M2
FOLATE SERPL-MCNC: 13.6 NG/ML
GLUCOSE SERPL-MCNC: 94 MG/DL
POTASSIUM SERPL-SCNC: 4.3 MMOL/L
PROT SERPL-MCNC: 7.4 G/DL
SODIUM SERPL-SCNC: 133 MMOL/L
VIT B12 SERPL-MCNC: 616 PG/ML

## 2024-10-31 LAB — ANA SER IF-ACNC: NEGATIVE

## 2024-11-05 LAB
RF IGA SER-ACNC: <5 U
RF IGG SER-ACNC: <5 U
RF IGM SER-ACNC: <5 U

## 2024-12-05 ENCOUNTER — OUTPATIENT (OUTPATIENT)
Dept: OUTPATIENT SERVICES | Facility: HOSPITAL | Age: 69
LOS: 1 days | End: 2024-12-05
Payer: MEDICARE

## 2024-12-05 ENCOUNTER — TRANSCRIPTION ENCOUNTER (OUTPATIENT)
Age: 69
End: 2024-12-05

## 2024-12-05 ENCOUNTER — APPOINTMENT (OUTPATIENT)
Age: 69
End: 2024-12-05
Payer: MEDICARE

## 2024-12-05 VITALS
TEMPERATURE: 98.1 F | DIASTOLIC BLOOD PRESSURE: 92 MMHG | HEART RATE: 73 BPM | WEIGHT: 110 LBS | SYSTOLIC BLOOD PRESSURE: 150 MMHG | BODY MASS INDEX: 20.12 KG/M2 | RESPIRATION RATE: 16 BRPM | OXYGEN SATURATION: 99 %

## 2024-12-05 DIAGNOSIS — D72.819 DECREASED WHITE BLOOD CELL COUNT, UNSPECIFIED: ICD-10-CM

## 2024-12-05 PROCEDURE — 99213 OFFICE O/P EST LOW 20 MIN: CPT

## 2024-12-06 DIAGNOSIS — D72.819 DECREASED WHITE BLOOD CELL COUNT, UNSPECIFIED: ICD-10-CM

## 2024-12-11 PROBLEM — D72.819 LEUKOPENIA: Status: RESOLVED | Noted: 2024-12-11 | Resolved: 2024-12-11

## 2025-02-17 ENCOUNTER — EMERGENCY (EMERGENCY)
Facility: HOSPITAL | Age: 70
LOS: 0 days | Discharge: HOME | End: 2025-02-17
Attending: EMERGENCY MEDICINE
Payer: MEDICARE

## 2025-02-17 VITALS
OXYGEN SATURATION: 99 % | RESPIRATION RATE: 18 BRPM | DIASTOLIC BLOOD PRESSURE: 84 MMHG | SYSTOLIC BLOOD PRESSURE: 155 MMHG | HEART RATE: 75 BPM | TEMPERATURE: 98 F | WEIGHT: 113.1 LBS

## 2025-02-17 DIAGNOSIS — S52.022A DISPLACED FRACTURE OF OLECRANON PROCESS WITHOUT INTRAARTICULAR EXTENSION OF LEFT ULNA, INITIAL ENCOUNTER FOR CLOSED FRACTURE: ICD-10-CM
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
## 2025-02-17 PROCEDURE — 73080 X-RAY EXAM OF ELBOW: CPT | Mod: LT

## 2025-02-17 PROCEDURE — 24670 CLTX ULNAR FX PROX W/O MNPJ: CPT | Mod: 54,RT

## 2025-02-17 PROCEDURE — 99283 EMERGENCY DEPT VISIT LOW MDM: CPT | Mod: 25

## 2025-02-17 PROCEDURE — 73080 X-RAY EXAM OF ELBOW: CPT | Mod: 26,LT

## 2025-02-17 PROCEDURE — 24670 CLTX ULNAR FX PROX W/O MNPJ: CPT | Mod: LT

## 2025-02-17 PROCEDURE — 99284 EMERGENCY DEPT VISIT MOD MDM: CPT | Mod: 57

## 2025-02-17 NOTE — ED PROVIDER NOTE - OBJECTIVE STATEMENT
69-year-old female presents ER for left elbow pain and slipped on ice fell directly on left elbow.  Pain with range of motion and associated with swelling overlying elbow.  No head injury, no neck pain, no chest pain or shortness of breath.  No other injuries

## 2025-02-17 NOTE — ED PROVIDER NOTE - PHYSICAL EXAMINATION
Physical Exam    Vital Signs: I have reviewed the initial vital signs.  Constitutional: appears stated age, no acute distress  Eyes: Conjunctiva pink, Sclera clear,   Cardiovascular: S1 and S2, regular rate, regular rhythm, well-perfused extremities, radial pulses equal and 2+, pedal pulses 2+ and equal  Respiratory: unlabored respiratory effort, clear to auscultation bilaterally no wheezing, rales and rhonchi  Gastrointestinal: soft, non-tender abdomen, no pulsatile mass, normal bowl sounds  Musculoskeletal: supple neck, no lower extremity edema, no midline tenderness. Swelling overlying left elbow with tenderness palpation range of motion limited secondary to pain  Integumentary: warm, dry, no rash  Neurologic: awake, alert, nvi

## 2025-02-17 NOTE — ED PROVIDER NOTE - ATTENDING APP SHARED VISIT CONTRIBUTION OF CARE
Pt is a 68yo female who slipped on ice and hit her L elbow.  No numbness.    Exam: 2+ radial pulse, nontender shoulder and wrist, L elbow tenderness, 5/5 hand   Plan: xr, splint, ortho f/u    Number of diagnoses or management options:  [ ] Referral or consultation made    Complexity of data reviewed:  [ ] Decision made to obtain old record(s) or additional history from the family, caretaker, or other source  [ ] Laboratory test(s) ordered and/or reviewed  [ ] Independent interpretation of EKG by Dr. Eduardo Rodríguez:  [x] Independent interpretation of Radiology by Dr. Eduardo Rodríguez:  fracture  [ ] I, Eduardo Rodríguez, had a discussion with    Risk (Management Options):  [ ] High: emergency surgery; monitored drug therapy; controlled parenteral therapy; decision for DNR

## 2025-02-17 NOTE — ED ADULT NURSE NOTE - TEMPLATE LIST FOR HEAD TO TOE ASSESSMENT
Bedside SBAR received from Margaux Colvin RN. I have reviewed discharge instructions with the patient. The patient verbalized understanding. Orthopedic

## 2025-02-17 NOTE — ED PROVIDER NOTE - NSFOLLOWUPINSTRUCTIONS_ED_ALL_ED_FT
Our Emergency Department Referral Coordinators will be reaching out to you in the next 24-48 hours from 9:00am to 5:00pm with a follow up appointment. Please expect a phone call from the hospital in that time frame. If you do not receive a call or if you have any questions or concerns, you can reach them at   [phone]       Fracture    A fracture is a break in one of your bones. This can occur from a variety of injuries, especially traumatic ones. Symptoms include pain, bruising, or swelling. Do not use the injured limb. If a fracture is in one of the bones below your waist, do not put weight on that limb unless instructed to do so by your healthcare provider. Crutches or a cane may have been provided. A splint or cast may have been applied by your health care provider. Make sure to keep it dry and follow up with an orthopedist as instructed.    SEEK IMMEDIATE MEDICAL CARE IF YOU HAVE ANY OF THE FOLLOWING SYMPTOMS: numbness, tingling, increasing pain, or weakness in any part of the injured limb.

## 2025-02-17 NOTE — ED PROVIDER NOTE - PATIENT PORTAL LINK FT
You can access the FollowMyHealth Patient Portal offered by [facility] by registering at the following website: [email or web address]. By joining [facility]’s FollowMyHealth portal, you will also be able to view your health information using other applications (apps) compatible with our system.

## 2025-02-17 NOTE — ED ADULT NURSE NOTE - NSFALLUNIVINTERV_ED_ALL_ED
Bed/Stretcher in lowest position, wheels locked, appropriate side rails in place/Call bell, personal items and telephone in reach/Instruct patient to call for assistance before getting out of bed/chair/stretcher/Non-slip footwear applied when patient is off stretcher/[place] to call system/Physically safe environment - no spills, clutter or unnecessary equipment/Purposeful proactive rounding/Room/bathroom lighting operational, light cord in reach

## 2025-02-18 ENCOUNTER — NON-APPOINTMENT (OUTPATIENT)
Age: 70
End: 2025-02-18

## 2025-02-18 ENCOUNTER — APPOINTMENT (OUTPATIENT)
Dept: ORTHOPEDIC SURGERY | Facility: CLINIC | Age: 70
End: 2025-02-18
Payer: MEDICARE

## 2025-02-18 DIAGNOSIS — S52.022A DISPLACED FRACTURE OF OLECRANON PROCESS W/OUT INTRAARTICULAR EXTENSION OF LEFT ULNA, INITIAL ENCOUNTER FOR CLOSED FRACTURE: ICD-10-CM

## 2025-02-18 PROCEDURE — 99213 OFFICE O/P EST LOW 20 MIN: CPT | Mod: 25

## 2025-02-18 RX ORDER — TRAMADOL HYDROCHLORIDE 50 MG/1
50 TABLET, COATED ORAL
Qty: 10 | Refills: 0 | Status: ACTIVE | COMMUNITY
Start: 2025-02-18 | End: 1900-01-01